# Patient Record
Sex: MALE | Employment: OTHER | URBAN - METROPOLITAN AREA
[De-identification: names, ages, dates, MRNs, and addresses within clinical notes are randomized per-mention and may not be internally consistent; named-entity substitution may affect disease eponyms.]

---

## 2017-01-01 ENCOUNTER — HOSPICE ADMISSION (OUTPATIENT)
Dept: HOSPICE | Facility: HOSPICE | Age: 82
End: 2017-01-01

## 2017-01-01 ENCOUNTER — APPOINTMENT (OUTPATIENT)
Dept: GENERAL RADIOLOGY | Age: 82
DRG: 291 | End: 2017-01-01
Attending: HOSPITALIST
Payer: MEDICARE

## 2017-01-01 ENCOUNTER — HOSPITAL ENCOUNTER (INPATIENT)
Age: 82
LOS: 11 days | DRG: 291 | End: 2017-04-13
Attending: EMERGENCY MEDICINE | Admitting: INTERNAL MEDICINE
Payer: MEDICARE

## 2017-01-01 ENCOUNTER — APPOINTMENT (OUTPATIENT)
Dept: GENERAL RADIOLOGY | Age: 82
DRG: 291 | End: 2017-01-01
Attending: EMERGENCY MEDICINE
Payer: MEDICARE

## 2017-01-01 VITALS
DIASTOLIC BLOOD PRESSURE: 61 MMHG | HEART RATE: 102 BPM | BODY MASS INDEX: 18.12 KG/M2 | SYSTOLIC BLOOD PRESSURE: 105 MMHG | RESPIRATION RATE: 18 BRPM | TEMPERATURE: 98.4 F | HEIGHT: 69 IN | OXYGEN SATURATION: 98 % | WEIGHT: 122.36 LBS

## 2017-01-01 DIAGNOSIS — R13.12 OROPHARYNGEAL DYSPHAGIA: ICD-10-CM

## 2017-01-01 DIAGNOSIS — Z86.73 HISTORY OF CVA (CEREBROVASCULAR ACCIDENT): ICD-10-CM

## 2017-01-01 DIAGNOSIS — J69.0 ASPIRATION PNEUMONIA OF RIGHT UPPER LOBE DUE TO REGURGITATED FOOD (HCC): ICD-10-CM

## 2017-01-01 DIAGNOSIS — N18.3 CKD (CHRONIC KIDNEY DISEASE), STAGE 3 (MODERATE): ICD-10-CM

## 2017-01-01 DIAGNOSIS — R09.02 HYPOXIA: ICD-10-CM

## 2017-01-01 DIAGNOSIS — G40.909 SEIZURE DISORDER (HCC): ICD-10-CM

## 2017-01-01 DIAGNOSIS — J81.0 ACUTE PULMONARY EDEMA (HCC): Primary | ICD-10-CM

## 2017-01-01 DIAGNOSIS — I48.20 CHRONIC ATRIAL FIBRILLATION (HCC): ICD-10-CM

## 2017-01-01 DIAGNOSIS — I50.9 ACUTE ON CHRONIC CONGESTIVE HEART FAILURE, UNSPECIFIED CONGESTIVE HEART FAILURE TYPE: ICD-10-CM

## 2017-01-01 LAB
ALBUMIN SERPL BCP-MCNC: 2.2 G/DL (ref 3.4–5)
ALBUMIN SERPL BCP-MCNC: 2.8 G/DL (ref 3.4–5)
ALBUMIN/GLOB SERPL: 0.4 {RATIO} (ref 0.8–1.7)
ALBUMIN/GLOB SERPL: 0.7 {RATIO} (ref 0.8–1.7)
ALP SERPL-CCNC: 55 U/L (ref 45–117)
ALP SERPL-CCNC: 71 U/L (ref 45–117)
ALT SERPL-CCNC: 18 U/L (ref 16–61)
ALT SERPL-CCNC: 20 U/L (ref 16–61)
ANION GAP BLD CALC-SCNC: 4 MMOL/L (ref 3–18)
ANION GAP BLD CALC-SCNC: 5 MMOL/L (ref 3–18)
ANION GAP BLD CALC-SCNC: 6 MMOL/L (ref 3–18)
ANION GAP BLD CALC-SCNC: 6 MMOL/L (ref 3–18)
ANION GAP BLD CALC-SCNC: 7 MMOL/L (ref 3–18)
ANION GAP BLD CALC-SCNC: 7 MMOL/L (ref 3–18)
APTT PPP: 51 SEC (ref 23–36.4)
ARTERIAL PATENCY WRIST A: ABNORMAL
AST SERPL W P-5'-P-CCNC: 21 U/L (ref 15–37)
AST SERPL W P-5'-P-CCNC: 34 U/L (ref 15–37)
BACTERIA SPEC CULT: NORMAL
BASE EXCESS BLD CALC-SCNC: 6 MMOL/L
BASOPHILS # BLD AUTO: 0.1 K/UL (ref 0–0.06)
BASOPHILS # BLD: 0 % (ref 0–2)
BASOPHILS # BLD: 0 % (ref 0–2)
BASOPHILS # BLD: 1 % (ref 0–2)
BDY SITE: ABNORMAL
BILIRUB SERPL-MCNC: 0.5 MG/DL (ref 0.2–1)
BILIRUB SERPL-MCNC: 0.5 MG/DL (ref 0.2–1)
BNP SERPL-MCNC: 4374 PG/ML (ref 0–1800)
BODY TEMPERATURE: 37
BUN SERPL-MCNC: 20 MG/DL (ref 7–18)
BUN SERPL-MCNC: 24 MG/DL (ref 7–18)
BUN SERPL-MCNC: 26 MG/DL (ref 7–18)
BUN SERPL-MCNC: 38 MG/DL (ref 7–18)
BUN SERPL-MCNC: 58 MG/DL (ref 7–18)
BUN SERPL-MCNC: 59 MG/DL (ref 7–18)
BUN/CREAT SERPL: 15 (ref 12–20)
BUN/CREAT SERPL: 18 (ref 12–20)
BUN/CREAT SERPL: 19 (ref 12–20)
BUN/CREAT SERPL: 20 (ref 12–20)
BUN/CREAT SERPL: 21 (ref 12–20)
BUN/CREAT SERPL: 25 (ref 12–20)
BUN/CREAT SERPL: 39 (ref 12–20)
BUN/CREAT SERPL: 44 (ref 12–20)
CALCIUM SERPL-MCNC: 8.1 MG/DL (ref 8.5–10.1)
CALCIUM SERPL-MCNC: 8.3 MG/DL (ref 8.5–10.1)
CALCIUM SERPL-MCNC: 8.4 MG/DL (ref 8.5–10.1)
CALCIUM SERPL-MCNC: 8.5 MG/DL (ref 8.5–10.1)
CALCIUM SERPL-MCNC: 8.5 MG/DL (ref 8.5–10.1)
CALCIUM SERPL-MCNC: 9.2 MG/DL (ref 8.5–10.1)
CALCIUM SERPL-MCNC: 9.2 MG/DL (ref 8.5–10.1)
CALCIUM SERPL-MCNC: 9.5 MG/DL (ref 8.5–10.1)
CHLORIDE SERPL-SCNC: 101 MMOL/L (ref 100–108)
CHLORIDE SERPL-SCNC: 102 MMOL/L (ref 100–108)
CHLORIDE SERPL-SCNC: 102 MMOL/L (ref 100–108)
CHLORIDE SERPL-SCNC: 103 MMOL/L (ref 100–108)
CHLORIDE SERPL-SCNC: 104 MMOL/L (ref 100–108)
CHLORIDE SERPL-SCNC: 105 MMOL/L (ref 100–108)
CHLORIDE SERPL-SCNC: 106 MMOL/L (ref 100–108)
CHLORIDE SERPL-SCNC: 107 MMOL/L (ref 100–108)
CK MB CFR SERPL CALC: NORMAL % (ref 0–4)
CK MB SERPL-MCNC: <1 NG/ML (ref 5–25)
CK SERPL-CCNC: 50 U/L (ref 39–308)
CK SERPL-CCNC: 64 U/L (ref 39–308)
CK SERPL-CCNC: 78 U/L (ref 39–308)
CK SERPL-CCNC: 78 U/L (ref 39–308)
CK SERPL-CCNC: 81 U/L (ref 39–308)
CK SERPL-CCNC: 81 U/L (ref 39–308)
CO2 SERPL-SCNC: 31 MMOL/L (ref 21–32)
CO2 SERPL-SCNC: 31 MMOL/L (ref 21–32)
CO2 SERPL-SCNC: 33 MMOL/L (ref 21–32)
CO2 SERPL-SCNC: 34 MMOL/L (ref 21–32)
CO2 SERPL-SCNC: 35 MMOL/L (ref 21–32)
CO2 SERPL-SCNC: 36 MMOL/L (ref 21–32)
CO2 SERPL-SCNC: 37 MMOL/L (ref 21–32)
CO2 SERPL-SCNC: 38 MMOL/L (ref 21–32)
CREAT SERPL-MCNC: 1.14 MG/DL (ref 0.6–1.3)
CREAT SERPL-MCNC: 1.24 MG/DL (ref 0.6–1.3)
CREAT SERPL-MCNC: 1.32 MG/DL (ref 0.6–1.3)
CREAT SERPL-MCNC: 1.33 MG/DL (ref 0.6–1.3)
CREAT SERPL-MCNC: 1.33 MG/DL (ref 0.6–1.3)
CREAT SERPL-MCNC: 1.34 MG/DL (ref 0.6–1.3)
CREAT SERPL-MCNC: 1.49 MG/DL (ref 0.6–1.3)
CREAT SERPL-MCNC: 1.51 MG/DL (ref 0.6–1.3)
DIFFERENTIAL METHOD BLD: ABNORMAL
DIGOXIN SERPL-MCNC: 0.8 NG/ML (ref 0.9–2)
DIGOXIN SERPL-MCNC: 1 NG/ML (ref 0.9–2)
DIGOXIN SERPL-MCNC: 1.1 NG/ML (ref 0.9–2)
EOSINOPHIL # BLD: 0.2 K/UL (ref 0–0.4)
EOSINOPHIL # BLD: 0.2 K/UL (ref 0–0.4)
EOSINOPHIL # BLD: 0.3 K/UL (ref 0–0.4)
EOSINOPHIL NFR BLD: 1 % (ref 0–5)
EOSINOPHIL NFR BLD: 1 % (ref 0–5)
EOSINOPHIL NFR BLD: 2 % (ref 0–5)
ERYTHROCYTE [DISTWIDTH] IN BLOOD BY AUTOMATED COUNT: 14.5 % (ref 11.6–14.5)
ERYTHROCYTE [DISTWIDTH] IN BLOOD BY AUTOMATED COUNT: 14.6 % (ref 11.6–14.5)
ERYTHROCYTE [DISTWIDTH] IN BLOOD BY AUTOMATED COUNT: 14.7 % (ref 11.6–14.5)
ERYTHROCYTE [DISTWIDTH] IN BLOOD BY AUTOMATED COUNT: 14.7 % (ref 11.6–14.5)
GAS FLOW.O2 O2 DELIVERY SYS: ABNORMAL L/MIN
GAS FLOW.O2 SETTING OXYMISER: 3 L/M
GLOBULIN SER CALC-MCNC: 4.3 G/DL (ref 2–4)
GLOBULIN SER CALC-MCNC: 5.2 G/DL (ref 2–4)
GLUCOSE BLD STRIP.AUTO-MCNC: 101 MG/DL (ref 70–110)
GLUCOSE BLD STRIP.AUTO-MCNC: 104 MG/DL (ref 70–110)
GLUCOSE BLD STRIP.AUTO-MCNC: 105 MG/DL (ref 70–110)
GLUCOSE BLD STRIP.AUTO-MCNC: 105 MG/DL (ref 70–110)
GLUCOSE BLD STRIP.AUTO-MCNC: 111 MG/DL (ref 70–110)
GLUCOSE BLD STRIP.AUTO-MCNC: 118 MG/DL (ref 70–110)
GLUCOSE BLD STRIP.AUTO-MCNC: 119 MG/DL (ref 70–110)
GLUCOSE BLD STRIP.AUTO-MCNC: 125 MG/DL (ref 70–110)
GLUCOSE BLD STRIP.AUTO-MCNC: 126 MG/DL (ref 70–110)
GLUCOSE BLD STRIP.AUTO-MCNC: 138 MG/DL (ref 70–110)
GLUCOSE BLD STRIP.AUTO-MCNC: 139 MG/DL (ref 70–110)
GLUCOSE BLD STRIP.AUTO-MCNC: 142 MG/DL (ref 70–110)
GLUCOSE BLD STRIP.AUTO-MCNC: 144 MG/DL (ref 70–110)
GLUCOSE BLD STRIP.AUTO-MCNC: 148 MG/DL (ref 70–110)
GLUCOSE BLD STRIP.AUTO-MCNC: 154 MG/DL (ref 70–110)
GLUCOSE BLD STRIP.AUTO-MCNC: 155 MG/DL (ref 70–110)
GLUCOSE BLD STRIP.AUTO-MCNC: 161 MG/DL (ref 70–110)
GLUCOSE BLD STRIP.AUTO-MCNC: 162 MG/DL (ref 70–110)
GLUCOSE BLD STRIP.AUTO-MCNC: 165 MG/DL (ref 70–110)
GLUCOSE BLD STRIP.AUTO-MCNC: 166 MG/DL (ref 70–110)
GLUCOSE BLD STRIP.AUTO-MCNC: 173 MG/DL (ref 70–110)
GLUCOSE BLD STRIP.AUTO-MCNC: 193 MG/DL (ref 70–110)
GLUCOSE BLD STRIP.AUTO-MCNC: 208 MG/DL (ref 70–110)
GLUCOSE BLD STRIP.AUTO-MCNC: 216 MG/DL (ref 70–110)
GLUCOSE BLD STRIP.AUTO-MCNC: 236 MG/DL (ref 70–110)
GLUCOSE BLD STRIP.AUTO-MCNC: 249 MG/DL (ref 70–110)
GLUCOSE BLD STRIP.AUTO-MCNC: 252 MG/DL (ref 70–110)
GLUCOSE BLD STRIP.AUTO-MCNC: 259 MG/DL (ref 70–110)
GLUCOSE BLD STRIP.AUTO-MCNC: 269 MG/DL (ref 70–110)
GLUCOSE BLD STRIP.AUTO-MCNC: 97 MG/DL (ref 70–110)
GLUCOSE SERPL-MCNC: 106 MG/DL (ref 74–99)
GLUCOSE SERPL-MCNC: 118 MG/DL (ref 74–99)
GLUCOSE SERPL-MCNC: 127 MG/DL (ref 74–99)
GLUCOSE SERPL-MCNC: 129 MG/DL (ref 74–99)
GLUCOSE SERPL-MCNC: 157 MG/DL (ref 74–99)
GLUCOSE SERPL-MCNC: 158 MG/DL (ref 74–99)
GLUCOSE SERPL-MCNC: 90 MG/DL (ref 74–99)
GLUCOSE SERPL-MCNC: 98 MG/DL (ref 74–99)
HCO3 BLD-SCNC: 30.7 MMOL/L (ref 22–26)
HCT VFR BLD AUTO: 36.1 % (ref 36–48)
HCT VFR BLD AUTO: 37.8 % (ref 36–48)
HCT VFR BLD AUTO: 39.3 % (ref 36–48)
HCT VFR BLD AUTO: 40 % (ref 36–48)
HCT VFR BLD AUTO: 40.1 % (ref 36–48)
HGB BLD-MCNC: 12 G/DL (ref 13–16)
HGB BLD-MCNC: 12.3 G/DL (ref 13–16)
HGB BLD-MCNC: 12.7 G/DL (ref 13–16)
HGB BLD-MCNC: 13 G/DL (ref 13–16)
HGB BLD-MCNC: 13 G/DL (ref 13–16)
INR PPP: 2.4 (ref 0.8–1.2)
INR PPP: 2.4 (ref 0.8–1.2)
INR PPP: 2.5 (ref 0.8–1.2)
INR PPP: 2.5 (ref 0.8–1.2)
INR PPP: 2.7 (ref 0.8–1.2)
INR PPP: 3 (ref 0.8–1.2)
INR PPP: 3.4 (ref 0.8–1.2)
INR PPP: 3.5 (ref 0.8–1.2)
INR PPP: 3.7 (ref 0.8–1.2)
INR PPP: 4.3 (ref 0.8–1.2)
LACTATE SERPL-SCNC: 0.9 MMOL/L (ref 0.4–2)
LYMPHOCYTES # BLD AUTO: 17 % (ref 21–52)
LYMPHOCYTES # BLD AUTO: 20 % (ref 21–52)
LYMPHOCYTES # BLD AUTO: 26 % (ref 21–52)
LYMPHOCYTES # BLD: 2.7 K/UL (ref 0.9–3.6)
LYMPHOCYTES # BLD: 2.7 K/UL (ref 0.9–3.6)
LYMPHOCYTES # BLD: 3.7 K/UL (ref 0.9–3.6)
MAGNESIUM SERPL-MCNC: 1.8 MG/DL (ref 1.6–2.6)
MAGNESIUM SERPL-MCNC: 1.8 MG/DL (ref 1.8–2.4)
MAGNESIUM SERPL-MCNC: 1.9 MG/DL (ref 1.6–2.6)
MAGNESIUM SERPL-MCNC: 1.9 MG/DL (ref 1.6–2.6)
MAGNESIUM SERPL-MCNC: 2 MG/DL (ref 1.6–2.6)
MAGNESIUM SERPL-MCNC: 2.1 MG/DL (ref 1.6–2.6)
MAGNESIUM SERPL-MCNC: 2.1 MG/DL (ref 1.6–2.6)
MAGNESIUM SERPL-MCNC: 2.3 MG/DL (ref 1.6–2.6)
MAGNESIUM SERPL-MCNC: 2.4 MG/DL (ref 1.6–2.6)
MCH RBC QN AUTO: 30.8 PG (ref 24–34)
MCH RBC QN AUTO: 30.8 PG (ref 24–34)
MCH RBC QN AUTO: 31 PG (ref 24–34)
MCH RBC QN AUTO: 31.3 PG (ref 24–34)
MCHC RBC AUTO-ENTMCNC: 32.3 G/DL (ref 31–37)
MCHC RBC AUTO-ENTMCNC: 32.5 G/DL (ref 31–37)
MCHC RBC AUTO-ENTMCNC: 32.5 G/DL (ref 31–37)
MCHC RBC AUTO-ENTMCNC: 33.2 G/DL (ref 31–37)
MCV RBC AUTO: 94 FL (ref 74–97)
MCV RBC AUTO: 94.5 FL (ref 74–97)
MCV RBC AUTO: 95.4 FL (ref 74–97)
MCV RBC AUTO: 95.5 FL (ref 74–97)
MONOCYTES # BLD: 1.7 K/UL (ref 0.05–1.2)
MONOCYTES # BLD: 2 K/UL (ref 0.05–1.2)
MONOCYTES # BLD: 2.6 K/UL (ref 0.05–1.2)
MONOCYTES NFR BLD AUTO: 13 % (ref 3–10)
MONOCYTES NFR BLD AUTO: 13 % (ref 3–10)
MONOCYTES NFR BLD AUTO: 19 % (ref 3–10)
NEUTS SEG # BLD: 10.6 K/UL (ref 1.8–8)
NEUTS SEG # BLD: 7.6 K/UL (ref 1.8–8)
NEUTS SEG # BLD: 9 K/UL (ref 1.8–8)
NEUTS SEG NFR BLD AUTO: 53 % (ref 40–73)
NEUTS SEG NFR BLD AUTO: 65 % (ref 40–73)
NEUTS SEG NFR BLD AUTO: 69 % (ref 40–73)
O2/TOTAL GAS SETTING VFR VENT: 0.32 %
PCO2 BLD: 49.3 MMHG (ref 35–45)
PH BLD: 7.4 [PH] (ref 7.35–7.45)
PHENYTOIN SERPL-MCNC: 6 UG/ML (ref 10–20)
PHENYTOIN SERPL-MCNC: 8.3 UG/ML (ref 10–20)
PLATELET # BLD AUTO: 101 K/UL (ref 135–420)
PLATELET # BLD AUTO: 105 K/UL (ref 135–420)
PLATELET # BLD AUTO: 155 K/UL (ref 135–420)
PLATELET # BLD AUTO: 174 K/UL (ref 135–420)
PLATELET # BLD AUTO: 93 K/UL (ref 135–420)
PMV BLD AUTO: 10 FL (ref 9.2–11.8)
PMV BLD AUTO: 10.2 FL (ref 9.2–11.8)
PMV BLD AUTO: 10.3 FL (ref 9.2–11.8)
PMV BLD AUTO: 10.4 FL (ref 9.2–11.8)
PO2 BLD: 113 MMHG (ref 80–100)
POTASSIUM SERPL-SCNC: 3.6 MMOL/L (ref 3.5–5.5)
POTASSIUM SERPL-SCNC: 3.6 MMOL/L (ref 3.5–5.5)
POTASSIUM SERPL-SCNC: 3.7 MMOL/L (ref 3.5–5.5)
POTASSIUM SERPL-SCNC: 3.8 MMOL/L (ref 3.5–5.5)
POTASSIUM SERPL-SCNC: 3.9 MMOL/L (ref 3.5–5.5)
POTASSIUM SERPL-SCNC: 4 MMOL/L (ref 3.5–5.5)
POTASSIUM SERPL-SCNC: 4.1 MMOL/L (ref 3.5–5.5)
POTASSIUM SERPL-SCNC: 4.2 MMOL/L (ref 3.5–5.5)
PROT SERPL-MCNC: 7.1 G/DL (ref 6.4–8.2)
PROT SERPL-MCNC: 7.4 G/DL (ref 6.4–8.2)
PROTHROMBIN TIME: 24.8 SEC (ref 11.5–15.2)
PROTHROMBIN TIME: 25.1 SEC (ref 11.5–15.2)
PROTHROMBIN TIME: 25.8 SEC (ref 11.5–15.2)
PROTHROMBIN TIME: 25.9 SEC (ref 11.5–15.2)
PROTHROMBIN TIME: 27.5 SEC (ref 11.5–15.2)
PROTHROMBIN TIME: 29.6 SEC (ref 11.5–15.2)
PROTHROMBIN TIME: 32.6 SEC (ref 11.5–15.2)
PROTHROMBIN TIME: 33.2 SEC (ref 11.5–15.2)
PROTHROMBIN TIME: 34.4 SEC (ref 11.5–15.2)
PROTHROMBIN TIME: 38.9 SEC (ref 11.5–15.2)
RBC # BLD AUTO: 3.84 M/UL (ref 4.7–5.5)
RBC # BLD AUTO: 4 M/UL (ref 4.7–5.5)
RBC # BLD AUTO: 4.12 M/UL (ref 4.7–5.5)
RBC # BLD AUTO: 4.19 M/UL (ref 4.7–5.5)
SAO2 % BLD: 98 % (ref 92–97)
SERVICE CMNT-IMP: ABNORMAL
SERVICE CMNT-IMP: NORMAL
SODIUM SERPL-SCNC: 137 MMOL/L (ref 136–145)
SODIUM SERPL-SCNC: 138 MMOL/L (ref 136–145)
SODIUM SERPL-SCNC: 139 MMOL/L (ref 136–145)
SODIUM SERPL-SCNC: 145 MMOL/L (ref 136–145)
SODIUM SERPL-SCNC: 145 MMOL/L (ref 136–145)
SODIUM SERPL-SCNC: 147 MMOL/L (ref 136–145)
SODIUM SERPL-SCNC: 148 MMOL/L (ref 136–145)
SODIUM SERPL-SCNC: 149 MMOL/L (ref 136–145)
SPECIMEN TYPE: ABNORMAL
TOTAL RESP. RATE, ITRR: 25
TROPONIN I SERPL-MCNC: 0.06 NG/ML (ref 0–0.06)
WBC # BLD AUTO: 13.2 K/UL (ref 4.6–13.2)
WBC # BLD AUTO: 13.7 K/UL (ref 4.6–13.2)
WBC # BLD AUTO: 14.3 K/UL (ref 4.6–13.2)
WBC # BLD AUTO: 15.5 K/UL (ref 4.6–13.2)

## 2017-01-01 PROCEDURE — 71010 XR CHEST PORT: CPT

## 2017-01-01 PROCEDURE — 94640 AIRWAY INHALATION TREATMENT: CPT

## 2017-01-01 PROCEDURE — 82803 BLOOD GASES ANY COMBINATION: CPT

## 2017-01-01 PROCEDURE — 77010033678 HC OXYGEN DAILY

## 2017-01-01 PROCEDURE — 36415 COLL VENOUS BLD VENIPUNCTURE: CPT | Performed by: INTERNAL MEDICINE

## 2017-01-01 PROCEDURE — 97530 THERAPEUTIC ACTIVITIES: CPT

## 2017-01-01 PROCEDURE — 85018 HEMOGLOBIN: CPT | Performed by: INTERNAL MEDICINE

## 2017-01-01 PROCEDURE — 74011250636 HC RX REV CODE- 250/636: Performed by: INTERNAL MEDICINE

## 2017-01-01 PROCEDURE — 83735 ASSAY OF MAGNESIUM: CPT | Performed by: INTERNAL MEDICINE

## 2017-01-01 PROCEDURE — 74011250637 HC RX REV CODE- 250/637: Performed by: INTERNAL MEDICINE

## 2017-01-01 PROCEDURE — 74011250636 HC RX REV CODE- 250/636: Performed by: FAMILY MEDICINE

## 2017-01-01 PROCEDURE — 94669 MECHANICAL CHEST WALL OSCILL: CPT

## 2017-01-01 PROCEDURE — 74011636637 HC RX REV CODE- 636/637: Performed by: FAMILY MEDICINE

## 2017-01-01 PROCEDURE — 74011000250 HC RX REV CODE- 250: Performed by: INTERNAL MEDICINE

## 2017-01-01 PROCEDURE — 74011250636 HC RX REV CODE- 250/636: Performed by: HOSPITALIST

## 2017-01-01 PROCEDURE — 94760 N-INVAS EAR/PLS OXIMETRY 1: CPT

## 2017-01-01 PROCEDURE — 80048 BASIC METABOLIC PNL TOTAL CA: CPT | Performed by: INTERNAL MEDICINE

## 2017-01-01 PROCEDURE — 31720 CLEARANCE OF AIRWAYS: CPT

## 2017-01-01 PROCEDURE — 85025 COMPLETE CBC W/AUTO DIFF WBC: CPT | Performed by: INTERNAL MEDICINE

## 2017-01-01 PROCEDURE — 85610 PROTHROMBIN TIME: CPT | Performed by: INTERNAL MEDICINE

## 2017-01-01 PROCEDURE — 74011250637 HC RX REV CODE- 250/637: Performed by: HOSPITALIST

## 2017-01-01 PROCEDURE — 80053 COMPREHEN METABOLIC PANEL: CPT | Performed by: INTERNAL MEDICINE

## 2017-01-01 PROCEDURE — 85027 COMPLETE CBC AUTOMATED: CPT | Performed by: INTERNAL MEDICINE

## 2017-01-01 PROCEDURE — 83880 ASSAY OF NATRIURETIC PEPTIDE: CPT | Performed by: EMERGENCY MEDICINE

## 2017-01-01 PROCEDURE — 97116 GAIT TRAINING THERAPY: CPT

## 2017-01-01 PROCEDURE — 80162 ASSAY OF DIGOXIN TOTAL: CPT | Performed by: INTERNAL MEDICINE

## 2017-01-01 PROCEDURE — 74011000250 HC RX REV CODE- 250: Performed by: HOSPITALIST

## 2017-01-01 PROCEDURE — 65660000000 HC RM CCU STEPDOWN

## 2017-01-01 PROCEDURE — 74230 X-RAY XM SWLNG FUNCJ C+: CPT

## 2017-01-01 PROCEDURE — 99285 EMERGENCY DEPT VISIT HI MDM: CPT

## 2017-01-01 PROCEDURE — 82962 GLUCOSE BLOOD TEST: CPT

## 2017-01-01 PROCEDURE — 76450000000

## 2017-01-01 PROCEDURE — 74011250637 HC RX REV CODE- 250/637: Performed by: FAMILY MEDICINE

## 2017-01-01 PROCEDURE — 92526 ORAL FUNCTION THERAPY: CPT

## 2017-01-01 PROCEDURE — 80185 ASSAY OF PHENYTOIN TOTAL: CPT | Performed by: EMERGENCY MEDICINE

## 2017-01-01 PROCEDURE — 85049 AUTOMATED PLATELET COUNT: CPT | Performed by: INTERNAL MEDICINE

## 2017-01-01 PROCEDURE — 80053 COMPREHEN METABOLIC PANEL: CPT | Performed by: EMERGENCY MEDICINE

## 2017-01-01 PROCEDURE — 87040 BLOOD CULTURE FOR BACTERIA: CPT | Performed by: EMERGENCY MEDICINE

## 2017-01-01 PROCEDURE — 92610 EVALUATE SWALLOWING FUNCTION: CPT

## 2017-01-01 PROCEDURE — 97166 OT EVAL MOD COMPLEX 45 MIN: CPT

## 2017-01-01 PROCEDURE — 74011250636 HC RX REV CODE- 250/636: Performed by: EMERGENCY MEDICINE

## 2017-01-01 PROCEDURE — 36600 WITHDRAWAL OF ARTERIAL BLOOD: CPT

## 2017-01-01 PROCEDURE — 96374 THER/PROPH/DIAG INJ IV PUSH: CPT

## 2017-01-01 PROCEDURE — 97535 SELF CARE MNGMENT TRAINING: CPT

## 2017-01-01 PROCEDURE — 74011000255 HC RX REV CODE- 255: Performed by: EMERGENCY MEDICINE

## 2017-01-01 PROCEDURE — 85025 COMPLETE CBC W/AUTO DIFF WBC: CPT | Performed by: EMERGENCY MEDICINE

## 2017-01-01 PROCEDURE — 85610 PROTHROMBIN TIME: CPT | Performed by: EMERGENCY MEDICINE

## 2017-01-01 PROCEDURE — 97162 PT EVAL MOD COMPLEX 30 MIN: CPT

## 2017-01-01 PROCEDURE — 94668 MNPJ CHEST WALL SBSQ: CPT

## 2017-01-01 PROCEDURE — 82550 ASSAY OF CK (CPK): CPT | Performed by: EMERGENCY MEDICINE

## 2017-01-01 PROCEDURE — 77030013140 HC MSK NEB VYRM -A

## 2017-01-01 PROCEDURE — 74011000250 HC RX REV CODE- 250: Performed by: EMERGENCY MEDICINE

## 2017-01-01 PROCEDURE — 82550 ASSAY OF CK (CPK): CPT | Performed by: INTERNAL MEDICINE

## 2017-01-01 PROCEDURE — 85730 THROMBOPLASTIN TIME PARTIAL: CPT | Performed by: EMERGENCY MEDICINE

## 2017-01-01 PROCEDURE — 65270000029 HC RM PRIVATE

## 2017-01-01 PROCEDURE — 83605 ASSAY OF LACTIC ACID: CPT | Performed by: EMERGENCY MEDICINE

## 2017-01-01 PROCEDURE — 80185 ASSAY OF PHENYTOIN TOTAL: CPT | Performed by: INTERNAL MEDICINE

## 2017-01-01 PROCEDURE — 77030011256 HC DRSG MEPILEX <16IN NO BORD MOLN -A

## 2017-01-01 PROCEDURE — C8924 2D TTE W OR W/O FOL W/CON,FU: HCPCS

## 2017-01-01 PROCEDURE — 80162 ASSAY OF DIGOXIN TOTAL: CPT | Performed by: EMERGENCY MEDICINE

## 2017-01-01 PROCEDURE — 92611 MOTION FLUOROSCOPY/SWALLOW: CPT

## 2017-01-01 RX ORDER — DEXTROSE 50 % IN WATER (D50W) INTRAVENOUS SYRINGE
25-50 AS NEEDED
Status: DISCONTINUED | OUTPATIENT
Start: 2017-01-01 | End: 2017-01-01

## 2017-01-01 RX ORDER — INSULIN LISPRO 100 [IU]/ML
INJECTION, SOLUTION INTRAVENOUS; SUBCUTANEOUS
Status: DISCONTINUED | OUTPATIENT
Start: 2017-01-01 | End: 2017-01-01

## 2017-01-01 RX ORDER — SPIRONOLACTONE 25 MG/1
25 TABLET ORAL DAILY
COMMUNITY

## 2017-01-01 RX ORDER — SODIUM CHLORIDE 9 MG/ML
250 INJECTION, SOLUTION INTRAVENOUS CONTINUOUS
Status: DISPENSED | OUTPATIENT
Start: 2017-01-01 | End: 2017-01-01

## 2017-01-01 RX ORDER — CARVEDILOL 6.25 MG/1
6.25 TABLET ORAL 2 TIMES DAILY WITH MEALS
COMMUNITY

## 2017-01-01 RX ORDER — FUROSEMIDE 10 MG/ML
40 INJECTION INTRAMUSCULAR; INTRAVENOUS DAILY
Status: DISCONTINUED | OUTPATIENT
Start: 2017-01-01 | End: 2017-01-01

## 2017-01-01 RX ORDER — PHENYTOIN SODIUM 50 MG/ML
100 INJECTION, SOLUTION INTRAMUSCULAR; INTRAVENOUS EVERY 8 HOURS
Status: DISCONTINUED | OUTPATIENT
Start: 2017-01-01 | End: 2017-01-01

## 2017-01-01 RX ORDER — ACETAMINOPHEN 650 MG/1
650 SUPPOSITORY RECTAL
Status: DISCONTINUED | OUTPATIENT
Start: 2017-01-01 | End: 2017-01-01 | Stop reason: HOSPADM

## 2017-01-01 RX ORDER — WARFARIN 1 MG/1
1 TABLET ORAL ONCE
Status: ACTIVE | OUTPATIENT
Start: 2017-01-01 | End: 2017-01-01

## 2017-01-01 RX ORDER — DIGOXIN 250 MCG
0.25 TABLET ORAL
Status: DISCONTINUED | OUTPATIENT
Start: 2017-01-01 | End: 2017-01-01

## 2017-01-01 RX ORDER — LORAZEPAM 2 MG/ML
1 INJECTION INTRAMUSCULAR
Status: DISCONTINUED | OUTPATIENT
Start: 2017-01-01 | End: 2017-01-01 | Stop reason: HOSPADM

## 2017-01-01 RX ORDER — SCOLOPAMINE TRANSDERMAL SYSTEM 1 MG/1
1.5 PATCH, EXTENDED RELEASE TRANSDERMAL
Status: DISCONTINUED | OUTPATIENT
Start: 2017-01-01 | End: 2017-01-01

## 2017-01-01 RX ORDER — WARFARIN 4 MG/1
4 TABLET ORAL ONCE
Status: DISPENSED | OUTPATIENT
Start: 2017-01-01 | End: 2017-01-01

## 2017-01-01 RX ORDER — VALSARTAN 40 MG/1
20 TABLET ORAL DAILY
Status: DISCONTINUED | OUTPATIENT
Start: 2017-01-01 | End: 2017-01-01

## 2017-01-01 RX ORDER — FUROSEMIDE 10 MG/ML
20 INJECTION INTRAMUSCULAR; INTRAVENOUS 2 TIMES DAILY
Status: DISCONTINUED | OUTPATIENT
Start: 2017-01-01 | End: 2017-01-01

## 2017-01-01 RX ORDER — FACIAL-BODY WIPES
10 EACH TOPICAL DAILY PRN
Status: DISCONTINUED | OUTPATIENT
Start: 2017-01-01 | End: 2017-01-01 | Stop reason: HOSPADM

## 2017-01-01 RX ORDER — CARVEDILOL 3.12 MG/1
3.24 TABLET ORAL 2 TIMES DAILY WITH MEALS
Status: DISCONTINUED | OUTPATIENT
Start: 2017-01-01 | End: 2017-01-01

## 2017-01-01 RX ORDER — METOPROLOL TARTRATE 5 MG/5ML
1.25 INJECTION INTRAVENOUS
Status: DISCONTINUED | OUTPATIENT
Start: 2017-01-01 | End: 2017-01-01 | Stop reason: HOSPADM

## 2017-01-01 RX ORDER — FUROSEMIDE 10 MG/ML
40 INJECTION INTRAMUSCULAR; INTRAVENOUS 2 TIMES DAILY
Status: DISCONTINUED | OUTPATIENT
Start: 2017-01-01 | End: 2017-01-01

## 2017-01-01 RX ORDER — ACETAMINOPHEN 325 MG/1
650 TABLET ORAL
Status: DISCONTINUED | OUTPATIENT
Start: 2017-01-01 | End: 2017-01-01

## 2017-01-01 RX ORDER — POLYETHYLENE GLYCOL 3350 17 G/17G
17 POWDER, FOR SOLUTION ORAL 2 TIMES DAILY
Status: DISCONTINUED | OUTPATIENT
Start: 2017-01-01 | End: 2017-01-01

## 2017-01-01 RX ORDER — SPIRONOLACTONE 25 MG/1
25 TABLET ORAL DAILY
Status: DISCONTINUED | OUTPATIENT
Start: 2017-01-01 | End: 2017-01-01

## 2017-01-01 RX ORDER — WARFARIN 4 MG/1
4 TABLET ORAL ONCE
Status: COMPLETED | OUTPATIENT
Start: 2017-01-01 | End: 2017-01-01

## 2017-01-01 RX ORDER — DIGOXIN 0.25 MG/ML
125 INJECTION INTRAMUSCULAR; INTRAVENOUS DAILY
Status: DISCONTINUED | OUTPATIENT
Start: 2017-01-01 | End: 2017-01-01

## 2017-01-01 RX ORDER — BUDESONIDE 0.5 MG/2ML
500 INHALANT ORAL 2 TIMES DAILY
Status: DISCONTINUED | OUTPATIENT
Start: 2017-01-01 | End: 2017-01-01

## 2017-01-01 RX ORDER — LISINOPRIL 5 MG/1
2.5 TABLET ORAL DAILY
Status: DISCONTINUED | OUTPATIENT
Start: 2017-01-01 | End: 2017-01-01

## 2017-01-01 RX ORDER — DIGOXIN 0.25 MG/ML
100 INJECTION INTRAMUSCULAR; INTRAVENOUS DAILY
Status: DISCONTINUED | OUTPATIENT
Start: 2017-01-01 | End: 2017-01-01

## 2017-01-01 RX ORDER — PHENYTOIN 50 MG/1
100 TABLET, CHEWABLE ORAL 2 TIMES DAILY
Status: DISCONTINUED | OUTPATIENT
Start: 2017-01-01 | End: 2017-01-01 | Stop reason: HOSPADM

## 2017-01-01 RX ORDER — PHENYTOIN SODIUM 50 MG/ML
300 INJECTION, SOLUTION INTRAMUSCULAR; INTRAVENOUS EVERY 24 HOURS
Status: DISCONTINUED | OUTPATIENT
Start: 2017-01-01 | End: 2017-01-01 | Stop reason: DRUGHIGH

## 2017-01-01 RX ORDER — BUDESONIDE 0.5 MG/2ML
500 INHALANT ORAL
Status: DISCONTINUED | OUTPATIENT
Start: 2017-01-01 | End: 2017-01-01

## 2017-01-01 RX ORDER — GUAIFENESIN 600 MG/1
600 TABLET, EXTENDED RELEASE ORAL EVERY 12 HOURS
Status: DISCONTINUED | OUTPATIENT
Start: 2017-01-01 | End: 2017-01-01

## 2017-01-01 RX ORDER — LEVOTHYROXINE SODIUM 50 UG/1
50 TABLET ORAL
Status: DISCONTINUED | OUTPATIENT
Start: 2017-01-01 | End: 2017-01-01

## 2017-01-01 RX ORDER — PHENYTOIN SODIUM 100 MG/1
300 CAPSULE, EXTENDED RELEASE ORAL EVERY OTHER DAY
Status: DISCONTINUED | OUTPATIENT
Start: 2017-01-01 | End: 2017-01-01

## 2017-01-01 RX ORDER — FUROSEMIDE 10 MG/ML
80 INJECTION INTRAMUSCULAR; INTRAVENOUS
Status: COMPLETED | OUTPATIENT
Start: 2017-01-01 | End: 2017-01-01

## 2017-01-01 RX ORDER — PHENYTOIN SODIUM 100 MG/1
300 CAPSULE, EXTENDED RELEASE ORAL
Status: DISCONTINUED | OUTPATIENT
Start: 2017-01-01 | End: 2017-01-01

## 2017-01-01 RX ORDER — MAGNESIUM SULFATE 100 %
16 CRYSTALS MISCELLANEOUS AS NEEDED
Status: DISCONTINUED | OUTPATIENT
Start: 2017-01-01 | End: 2017-01-01

## 2017-01-01 RX ORDER — WARFARIN 3 MG/1
3 TABLET ORAL ONCE
Status: COMPLETED | OUTPATIENT
Start: 2017-01-01 | End: 2017-01-01

## 2017-01-01 RX ORDER — PHENYTOIN SODIUM 50 MG/ML
300 INJECTION, SOLUTION INTRAMUSCULAR; INTRAVENOUS EVERY 24 HOURS
Status: DISCONTINUED | OUTPATIENT
Start: 2017-01-01 | End: 2017-01-01

## 2017-01-01 RX ORDER — FUROSEMIDE 10 MG/ML
20 INJECTION INTRAMUSCULAR; INTRAVENOUS ONCE
Status: COMPLETED | OUTPATIENT
Start: 2017-01-01 | End: 2017-01-01

## 2017-01-01 RX ORDER — PHENYTOIN 50 MG/1
100 TABLET, CHEWABLE ORAL 3 TIMES DAILY
Status: DISCONTINUED | OUTPATIENT
Start: 2017-01-01 | End: 2017-01-01

## 2017-01-01 RX ORDER — MORPHINE SULFATE 20 MG/ML
10 SOLUTION ORAL
Status: DISCONTINUED | OUTPATIENT
Start: 2017-01-01 | End: 2017-01-01 | Stop reason: HOSPADM

## 2017-01-01 RX ORDER — DIGOXIN 125 MCG
0.12 TABLET ORAL
Status: DISCONTINUED | OUTPATIENT
Start: 2017-01-01 | End: 2017-01-01

## 2017-01-01 RX ORDER — LEVOFLOXACIN 500 MG/1
500 TABLET, FILM COATED ORAL EVERY 24 HOURS
Status: DISCONTINUED | OUTPATIENT
Start: 2017-01-01 | End: 2017-01-01

## 2017-01-01 RX ORDER — IPRATROPIUM BROMIDE AND ALBUTEROL SULFATE 2.5; .5 MG/3ML; MG/3ML
3 SOLUTION RESPIRATORY (INHALATION)
Status: DISCONTINUED | OUTPATIENT
Start: 2017-01-01 | End: 2017-01-01 | Stop reason: HOSPADM

## 2017-01-01 RX ORDER — LEVOFLOXACIN 5 MG/ML
500 INJECTION, SOLUTION INTRAVENOUS EVERY 24 HOURS
Status: DISCONTINUED | OUTPATIENT
Start: 2017-01-01 | End: 2017-01-01

## 2017-01-01 RX ORDER — ATROPINE SULFATE 10 MG/ML
3 SOLUTION/ DROPS OPHTHALMIC
Status: DISCONTINUED | OUTPATIENT
Start: 2017-01-01 | End: 2017-01-01 | Stop reason: HOSPADM

## 2017-01-01 RX ORDER — DEXTROSE MONOHYDRATE 50 MG/ML
25 INJECTION, SOLUTION INTRAVENOUS CONTINUOUS
Status: DISCONTINUED | OUTPATIENT
Start: 2017-01-01 | End: 2017-01-01

## 2017-01-01 RX ORDER — DOCUSATE SODIUM 100 MG/1
100 CAPSULE, LIQUID FILLED ORAL DAILY
Status: DISCONTINUED | OUTPATIENT
Start: 2017-01-01 | End: 2017-01-01

## 2017-01-01 RX ORDER — IPRATROPIUM BROMIDE AND ALBUTEROL SULFATE 2.5; .5 MG/3ML; MG/3ML
3 SOLUTION RESPIRATORY (INHALATION)
Status: COMPLETED | OUTPATIENT
Start: 2017-01-01 | End: 2017-01-01

## 2017-01-01 RX ORDER — ATORVASTATIN CALCIUM 20 MG/1
40 TABLET, FILM COATED ORAL DAILY
Status: DISCONTINUED | OUTPATIENT
Start: 2017-01-01 | End: 2017-01-01

## 2017-01-01 RX ORDER — DEXTROSE MONOHYDRATE 50 MG/ML
50 INJECTION, SOLUTION INTRAVENOUS CONTINUOUS
Status: DISCONTINUED | OUTPATIENT
Start: 2017-01-01 | End: 2017-01-01

## 2017-01-01 RX ORDER — ISOSORBIDE DINITRATE 30 MG/1
20 TABLET ORAL DAILY
COMMUNITY

## 2017-01-01 RX ORDER — ONDANSETRON 2 MG/ML
4 INJECTION INTRAMUSCULAR; INTRAVENOUS
Status: DISCONTINUED | OUTPATIENT
Start: 2017-01-01 | End: 2017-01-01 | Stop reason: HOSPADM

## 2017-01-01 RX ORDER — PHENYTOIN SODIUM 100 MG/1
200 CAPSULE, EXTENDED RELEASE ORAL EVERY OTHER DAY
Status: DISCONTINUED | OUTPATIENT
Start: 2017-01-01 | End: 2017-01-01

## 2017-01-01 RX ORDER — FUROSEMIDE 10 MG/ML
10 INJECTION INTRAMUSCULAR; INTRAVENOUS ONCE
Status: COMPLETED | OUTPATIENT
Start: 2017-01-01 | End: 2017-01-01

## 2017-01-01 RX ORDER — VALSARTAN 160 MG/1
160 TABLET ORAL DAILY
Status: DISCONTINUED | OUTPATIENT
Start: 2017-01-01 | End: 2017-01-01

## 2017-01-01 RX ORDER — DIGOXIN 125 MCG
0.12 TABLET ORAL DAILY
Status: DISCONTINUED | OUTPATIENT
Start: 2017-01-01 | End: 2017-01-01

## 2017-01-01 RX ADMIN — BUDESONIDE 500 MCG: 0.5 INHALANT RESPIRATORY (INHALATION) at 07:47

## 2017-01-01 RX ADMIN — CARVEDILOL 3.12 MG: 3.12 TABLET, FILM COATED ORAL at 17:26

## 2017-01-01 RX ADMIN — IPRATROPIUM BROMIDE AND ALBUTEROL SULFATE 3 ML: .5; 3 SOLUTION RESPIRATORY (INHALATION) at 07:47

## 2017-01-01 RX ADMIN — GUAIFENESIN 600 MG: 600 TABLET, EXTENDED RELEASE ORAL at 08:36

## 2017-01-01 RX ADMIN — PHENYTOIN SODIUM 100 MG: 50 INJECTION INTRAMUSCULAR; INTRAVENOUS at 14:45

## 2017-01-01 RX ADMIN — IPRATROPIUM BROMIDE AND ALBUTEROL SULFATE 3 ML: .5; 3 SOLUTION RESPIRATORY (INHALATION) at 23:58

## 2017-01-01 RX ADMIN — VALSARTAN 20 MG: 40 TABLET, FILM COATED ORAL at 11:44

## 2017-01-01 RX ADMIN — SODIUM CHLORIDE 500 ML: 900 INJECTION, SOLUTION INTRAVENOUS at 01:00

## 2017-01-01 RX ADMIN — IPRATROPIUM BROMIDE AND ALBUTEROL SULFATE 3 ML: .5; 3 SOLUTION RESPIRATORY (INHALATION) at 19:46

## 2017-01-01 RX ADMIN — ATORVASTATIN CALCIUM 40 MG: 20 TABLET, FILM COATED ORAL at 08:42

## 2017-01-01 RX ADMIN — PHENYTOIN 100 MG: 50 TABLET, CHEWABLE ORAL at 11:44

## 2017-01-01 RX ADMIN — BUDESONIDE 500 MCG: 0.5 INHALANT RESPIRATORY (INHALATION) at 13:11

## 2017-01-01 RX ADMIN — INSULIN LISPRO 2 UNITS: 100 INJECTION, SOLUTION INTRAVENOUS; SUBCUTANEOUS at 22:25

## 2017-01-01 RX ADMIN — EXTENDED PHENYTOIN SODIUM 300 MG: 100 CAPSULE ORAL at 22:15

## 2017-01-01 RX ADMIN — BUDESONIDE 500 MCG: 0.5 INHALANT RESPIRATORY (INHALATION) at 07:20

## 2017-01-01 RX ADMIN — POLYETHYLENE GLYCOL 3350 17 G: 17 POWDER, FOR SOLUTION ORAL at 11:12

## 2017-01-01 RX ADMIN — PHENYTOIN SODIUM 100 MG: 50 INJECTION INTRAMUSCULAR; INTRAVENOUS at 06:58

## 2017-01-01 RX ADMIN — IPRATROPIUM BROMIDE AND ALBUTEROL SULFATE 3 ML: .5; 3 SOLUTION RESPIRATORY (INHALATION) at 07:11

## 2017-01-01 RX ADMIN — EXTENDED PHENYTOIN SODIUM 300 MG: 100 CAPSULE ORAL at 22:25

## 2017-01-01 RX ADMIN — LEVOFLOXACIN 500 MG: 5 INJECTION, SOLUTION INTRAVENOUS at 17:03

## 2017-01-01 RX ADMIN — METOROPROLOL TARTRATE 1.25 MG: 5 INJECTION, SOLUTION INTRAVENOUS at 17:02

## 2017-01-01 RX ADMIN — WARFARIN SODIUM 4 MG: 4 TABLET ORAL at 17:43

## 2017-01-01 RX ADMIN — SODIUM CHLORIDE 500 ML: 900 INJECTION, SOLUTION INTRAVENOUS at 18:37

## 2017-01-01 RX ADMIN — FUROSEMIDE 20 MG: 10 INJECTION, SOLUTION INTRAMUSCULAR; INTRAVENOUS at 16:18

## 2017-01-01 RX ADMIN — LEVOTHYROXINE SODIUM 50 MCG: 50 TABLET ORAL at 06:49

## 2017-01-01 RX ADMIN — SPIRONOLACTONE 25 MG: 25 TABLET, FILM COATED ORAL at 08:38

## 2017-01-01 RX ADMIN — POLYETHYLENE GLYCOL 3350 17 G: 17 POWDER, FOR SOLUTION ORAL at 22:58

## 2017-01-01 RX ADMIN — BUDESONIDE 500 MCG: 0.5 INHALANT RESPIRATORY (INHALATION) at 07:11

## 2017-01-01 RX ADMIN — ISOSORBIDE DINITRATE 30 MG: 10 TABLET ORAL at 17:26

## 2017-01-01 RX ADMIN — CARVEDILOL 3.12 MG: 3.12 TABLET, FILM COATED ORAL at 11:12

## 2017-01-01 RX ADMIN — BUDESONIDE 500 MCG: 0.5 INHALANT RESPIRATORY (INHALATION) at 20:20

## 2017-01-01 RX ADMIN — ATORVASTATIN CALCIUM 40 MG: 20 TABLET, FILM COATED ORAL at 11:12

## 2017-01-01 RX ADMIN — IPRATROPIUM BROMIDE AND ALBUTEROL SULFATE 3 ML: .5; 3 SOLUTION RESPIRATORY (INHALATION) at 19:47

## 2017-01-01 RX ADMIN — SPIRONOLACTONE 25 MG: 25 TABLET, FILM COATED ORAL at 08:41

## 2017-01-01 RX ADMIN — DIGOXIN 0.12 MG: 0.12 TABLET ORAL at 11:12

## 2017-01-01 RX ADMIN — SPIRONOLACTONE 25 MG: 25 TABLET, FILM COATED ORAL at 10:44

## 2017-01-01 RX ADMIN — BARIUM SULFATE 20 ML: 400 SUSPENSION ORAL at 15:30

## 2017-01-01 RX ADMIN — PHENYTOIN SODIUM 100 MG: 50 INJECTION INTRAMUSCULAR; INTRAVENOUS at 06:40

## 2017-01-01 RX ADMIN — FUROSEMIDE 80 MG: 10 INJECTION, SOLUTION INTRAMUSCULAR; INTRAVENOUS at 06:21

## 2017-01-01 RX ADMIN — PHENYTOIN SODIUM 100 MG: 50 INJECTION INTRAMUSCULAR; INTRAVENOUS at 22:03

## 2017-01-01 RX ADMIN — INSULIN LISPRO 6 UNITS: 100 INJECTION, SOLUTION INTRAVENOUS; SUBCUTANEOUS at 12:20

## 2017-01-01 RX ADMIN — VALSARTAN 20 MG: 40 TABLET, FILM COATED ORAL at 11:12

## 2017-01-01 RX ADMIN — GUAIFENESIN 600 MG: 600 TABLET, EXTENDED RELEASE ORAL at 11:11

## 2017-01-01 RX ADMIN — GUAIFENESIN 600 MG: 600 TABLET, EXTENDED RELEASE ORAL at 08:41

## 2017-01-01 RX ADMIN — ATORVASTATIN CALCIUM 40 MG: 20 TABLET, FILM COATED ORAL at 11:44

## 2017-01-01 RX ADMIN — CARVEDILOL 3.12 MG: 3.12 TABLET, FILM COATED ORAL at 08:40

## 2017-01-01 RX ADMIN — FUROSEMIDE 40 MG: 10 INJECTION, SOLUTION INTRAMUSCULAR; INTRAVENOUS at 10:44

## 2017-01-01 RX ADMIN — BUDESONIDE 500 MCG: 0.5 INHALANT RESPIRATORY (INHALATION) at 19:45

## 2017-01-01 RX ADMIN — GUAIFENESIN 600 MG: 600 TABLET, EXTENDED RELEASE ORAL at 22:15

## 2017-01-01 RX ADMIN — INSULIN LISPRO 4 UNITS: 100 INJECTION, SOLUTION INTRAVENOUS; SUBCUTANEOUS at 22:39

## 2017-01-01 RX ADMIN — IPRATROPIUM BROMIDE AND ALBUTEROL SULFATE 3 ML: .5; 3 SOLUTION RESPIRATORY (INHALATION) at 13:11

## 2017-01-01 RX ADMIN — PHENYTOIN SODIUM 100 MG: 50 INJECTION INTRAMUSCULAR; INTRAVENOUS at 15:14

## 2017-01-01 RX ADMIN — GUAIFENESIN 600 MG: 600 TABLET, EXTENDED RELEASE ORAL at 23:00

## 2017-01-01 RX ADMIN — PHENYTOIN SODIUM 100 MG: 50 INJECTION INTRAMUSCULAR; INTRAVENOUS at 18:41

## 2017-01-01 RX ADMIN — GUAIFENESIN 600 MG: 600 TABLET, EXTENDED RELEASE ORAL at 22:39

## 2017-01-01 RX ADMIN — BUDESONIDE 500 MCG: 0.5 INHALANT RESPIRATORY (INHALATION) at 19:05

## 2017-01-01 RX ADMIN — PHENYTOIN SODIUM 100 MG: 50 INJECTION INTRAMUSCULAR; INTRAVENOUS at 14:11

## 2017-01-01 RX ADMIN — INSULIN LISPRO 4 UNITS: 100 INJECTION, SOLUTION INTRAVENOUS; SUBCUTANEOUS at 12:07

## 2017-01-01 RX ADMIN — IPRATROPIUM BROMIDE AND ALBUTEROL SULFATE 3 ML: .5; 3 SOLUTION RESPIRATORY (INHALATION) at 07:19

## 2017-01-01 RX ADMIN — BUDESONIDE 500 MCG: 0.5 INHALANT RESPIRATORY (INHALATION) at 20:37

## 2017-01-01 RX ADMIN — INSULIN LISPRO 2 UNITS: 100 INJECTION, SOLUTION INTRAVENOUS; SUBCUTANEOUS at 06:44

## 2017-01-01 RX ADMIN — ATORVASTATIN CALCIUM 40 MG: 20 TABLET, FILM COATED ORAL at 08:38

## 2017-01-01 RX ADMIN — BUDESONIDE 500 MCG: 0.5 INHALANT RESPIRATORY (INHALATION) at 08:42

## 2017-01-01 RX ADMIN — ISOSORBIDE DINITRATE 30 MG: 10 TABLET ORAL at 08:35

## 2017-01-01 RX ADMIN — VALSARTAN 20 MG: 40 TABLET, FILM COATED ORAL at 10:45

## 2017-01-01 RX ADMIN — LEVOTHYROXINE SODIUM 50 MCG: 50 TABLET ORAL at 07:14

## 2017-01-01 RX ADMIN — PHENYTOIN 100 MG: 50 TABLET, CHEWABLE ORAL at 18:01

## 2017-01-01 RX ADMIN — INSULIN LISPRO 2 UNITS: 100 INJECTION, SOLUTION INTRAVENOUS; SUBCUTANEOUS at 16:54

## 2017-01-01 RX ADMIN — GUAIFENESIN 600 MG: 600 TABLET, EXTENDED RELEASE ORAL at 10:44

## 2017-01-01 RX ADMIN — IPRATROPIUM BROMIDE AND ALBUTEROL SULFATE 3 ML: .5; 3 SOLUTION RESPIRATORY (INHALATION) at 19:24

## 2017-01-01 RX ADMIN — GUAIFENESIN 600 MG: 600 TABLET, EXTENDED RELEASE ORAL at 23:37

## 2017-01-01 RX ADMIN — WARFARIN SODIUM 3 MG: 3 TABLET ORAL at 18:03

## 2017-01-01 RX ADMIN — DIGOXIN 0.12 MG: 0.12 TABLET ORAL at 17:26

## 2017-01-01 RX ADMIN — CARVEDILOL 3.12 MG: 3.12 TABLET, FILM COATED ORAL at 08:36

## 2017-01-01 RX ADMIN — LEVOFLOXACIN 500 MG: 500 TABLET, FILM COATED ORAL at 18:01

## 2017-01-01 RX ADMIN — DEXTROSE MONOHYDRATE 25 ML/HR: 5 INJECTION, SOLUTION INTRAVENOUS at 16:59

## 2017-01-01 RX ADMIN — DOCUSATE SODIUM 100 MG: 100 CAPSULE, LIQUID FILLED ORAL at 11:12

## 2017-01-01 RX ADMIN — BARIUM SULFATE 5 ML: 400 PASTE ORAL at 15:30

## 2017-01-01 RX ADMIN — MORPHINE SULFATE 10 MG: 20 SOLUTION ORAL at 12:57

## 2017-01-01 RX ADMIN — DOCUSATE SODIUM 100 MG: 100 CAPSULE, LIQUID FILLED ORAL at 10:45

## 2017-01-01 RX ADMIN — DIGOXIN 100 MCG: 0.25 INJECTION INTRAMUSCULAR; INTRAVENOUS at 10:45

## 2017-01-01 RX ADMIN — LEVOFLOXACIN 500 MG: 5 INJECTION, SOLUTION INTRAVENOUS at 18:42

## 2017-01-01 RX ADMIN — MORPHINE SULFATE 10 MG: 20 SOLUTION ORAL at 12:10

## 2017-01-01 RX ADMIN — BUDESONIDE 500 MCG: 0.5 INHALANT RESPIRATORY (INHALATION) at 19:14

## 2017-01-01 RX ADMIN — SODIUM PHOSPHATE, DIBASIC AND SODIUM PHOSPHATE, MONOBASIC 118 ML: 7; 19 ENEMA RECTAL at 14:53

## 2017-01-01 RX ADMIN — LEVOFLOXACIN 500 MG: 5 INJECTION, SOLUTION INTRAVENOUS at 18:04

## 2017-01-01 RX ADMIN — WARFARIN SODIUM 4 MG: 4 TABLET ORAL at 19:56

## 2017-01-01 RX ADMIN — GUAIFENESIN 600 MG: 600 TABLET, EXTENDED RELEASE ORAL at 08:38

## 2017-01-01 RX ADMIN — SPIRONOLACTONE 25 MG: 25 TABLET, FILM COATED ORAL at 08:36

## 2017-01-01 RX ADMIN — GUAIFENESIN 600 MG: 600 TABLET, EXTENDED RELEASE ORAL at 22:25

## 2017-01-01 RX ADMIN — BUDESONIDE 500 MCG: 0.5 INHALANT RESPIRATORY (INHALATION) at 07:32

## 2017-01-01 RX ADMIN — IPRATROPIUM BROMIDE AND ALBUTEROL SULFATE 3 ML: .5; 3 SOLUTION RESPIRATORY (INHALATION) at 16:00

## 2017-01-01 RX ADMIN — BUDESONIDE 500 MCG: 0.5 INHALANT RESPIRATORY (INHALATION) at 07:00

## 2017-01-01 RX ADMIN — BUDESONIDE 500 MCG: 0.5 INHALANT RESPIRATORY (INHALATION) at 07:03

## 2017-01-01 RX ADMIN — DIGOXIN 100 MCG: 0.25 INJECTION INTRAMUSCULAR; INTRAVENOUS at 08:48

## 2017-01-01 RX ADMIN — IPRATROPIUM BROMIDE AND ALBUTEROL SULFATE 3 ML: .5; 3 SOLUTION RESPIRATORY (INHALATION) at 05:13

## 2017-01-01 RX ADMIN — PHENYTOIN SODIUM 100 MG: 50 INJECTION INTRAMUSCULAR; INTRAVENOUS at 06:36

## 2017-01-01 RX ADMIN — GUAIFENESIN 600 MG: 600 TABLET, EXTENDED RELEASE ORAL at 14:22

## 2017-01-01 RX ADMIN — CARVEDILOL 3.12 MG: 3.12 TABLET, FILM COATED ORAL at 10:44

## 2017-01-01 RX ADMIN — FUROSEMIDE 20 MG: 10 INJECTION, SOLUTION INTRAMUSCULAR; INTRAVENOUS at 17:17

## 2017-01-01 RX ADMIN — CARVEDILOL 3.12 MG: 3.12 TABLET, FILM COATED ORAL at 16:55

## 2017-01-01 RX ADMIN — BARIUM SULFATE 15 ML: 0.6 SUSPENSION ORAL at 15:29

## 2017-01-01 RX ADMIN — DIGOXIN 100 MCG: 0.25 INJECTION INTRAMUSCULAR; INTRAVENOUS at 10:22

## 2017-01-01 RX ADMIN — CARVEDILOL 3.12 MG: 3.12 TABLET, FILM COATED ORAL at 11:44

## 2017-01-01 RX ADMIN — FUROSEMIDE 40 MG: 10 INJECTION, SOLUTION INTRAMUSCULAR; INTRAVENOUS at 08:38

## 2017-01-01 RX ADMIN — ATORVASTATIN CALCIUM 40 MG: 20 TABLET, FILM COATED ORAL at 10:44

## 2017-01-01 RX ADMIN — PHENYTOIN 100 MG: 50 TABLET, CHEWABLE ORAL at 22:40

## 2017-01-01 RX ADMIN — LEVOTHYROXINE SODIUM 50 MCG: 50 TABLET ORAL at 07:00

## 2017-01-01 RX ADMIN — PHENYTOIN SODIUM 100 MG: 50 INJECTION INTRAMUSCULAR; INTRAVENOUS at 07:42

## 2017-01-01 RX ADMIN — FUROSEMIDE 40 MG: 10 INJECTION, SOLUTION INTRAMUSCULAR; INTRAVENOUS at 08:37

## 2017-01-01 RX ADMIN — MORPHINE SULFATE 10 MG: 20 SOLUTION ORAL at 15:17

## 2017-01-01 RX ADMIN — ISOSORBIDE DINITRATE 30 MG: 10 TABLET ORAL at 08:38

## 2017-01-01 RX ADMIN — VALSARTAN 20 MG: 40 TABLET, FILM COATED ORAL at 10:27

## 2017-01-01 RX ADMIN — PHENYTOIN SODIUM 100 MG: 50 INJECTION INTRAMUSCULAR; INTRAVENOUS at 21:27

## 2017-01-01 RX ADMIN — MORPHINE SULFATE 10 MG: 20 SOLUTION ORAL at 18:27

## 2017-01-01 RX ADMIN — SPIRONOLACTONE 25 MG: 25 TABLET, FILM COATED ORAL at 11:11

## 2017-01-01 RX ADMIN — FUROSEMIDE 10 MG: 10 INJECTION, SOLUTION INTRAMUSCULAR; INTRAVENOUS at 08:23

## 2017-01-01 RX ADMIN — INSULIN LISPRO 6 UNITS: 100 INJECTION, SOLUTION INTRAVENOUS; SUBCUTANEOUS at 17:01

## 2017-01-01 RX ADMIN — DIGOXIN 0.12 MG: 0.12 TABLET ORAL at 11:44

## 2017-01-01 RX ADMIN — POLYETHYLENE GLYCOL 3350 17 G: 17 POWDER, FOR SOLUTION ORAL at 22:40

## 2017-01-01 RX ADMIN — DOCUSATE SODIUM 100 MG: 100 CAPSULE, LIQUID FILLED ORAL at 11:44

## 2017-01-01 RX ADMIN — CARVEDILOL 3.12 MG: 3.12 TABLET, FILM COATED ORAL at 18:02

## 2017-01-01 RX ADMIN — DEXTROSE MONOHYDRATE 50 ML/HR: 5 INJECTION, SOLUTION INTRAVENOUS at 11:11

## 2017-01-01 RX ADMIN — FUROSEMIDE 40 MG: 10 INJECTION, SOLUTION INTRAMUSCULAR; INTRAVENOUS at 22:20

## 2017-01-01 RX ADMIN — IPRATROPIUM BROMIDE AND ALBUTEROL SULFATE 3 ML: .5; 3 SOLUTION RESPIRATORY (INHALATION) at 19:05

## 2017-01-01 RX ADMIN — PHENYTOIN SODIUM 100 MG: 50 INJECTION INTRAMUSCULAR; INTRAVENOUS at 22:19

## 2017-01-01 RX ADMIN — BUDESONIDE 500 MCG: 0.5 INHALANT RESPIRATORY (INHALATION) at 19:36

## 2017-01-01 RX ADMIN — IPRATROPIUM BROMIDE AND ALBUTEROL SULFATE 3 ML: .5; 3 SOLUTION RESPIRATORY (INHALATION) at 07:32

## 2017-01-01 RX ADMIN — BUDESONIDE 500 MCG: 0.5 INHALANT RESPIRATORY (INHALATION) at 19:47

## 2017-01-01 RX ADMIN — IPRATROPIUM BROMIDE AND ALBUTEROL SULFATE 3 ML: .5; 3 SOLUTION RESPIRATORY (INHALATION) at 15:58

## 2017-01-01 RX ADMIN — PHENYTOIN 100 MG: 50 TABLET, CHEWABLE ORAL at 11:12

## 2017-01-01 RX ADMIN — LEVOTHYROXINE SODIUM 50 MCG: 50 TABLET ORAL at 06:37

## 2017-01-01 RX ADMIN — IPRATROPIUM BROMIDE AND ALBUTEROL SULFATE 3 ML: .5; 3 SOLUTION RESPIRATORY (INHALATION) at 06:59

## 2017-01-01 RX ADMIN — DOCUSATE SODIUM 100 MG: 100 CAPSULE, LIQUID FILLED ORAL at 11:31

## 2017-01-01 RX ADMIN — CARVEDILOL 3.12 MG: 3.12 TABLET, FILM COATED ORAL at 08:38

## 2017-01-01 RX ADMIN — IPRATROPIUM BROMIDE AND ALBUTEROL SULFATE 3 ML: .5; 3 SOLUTION RESPIRATORY (INHALATION) at 19:45

## 2017-01-01 RX ADMIN — DIGOXIN 0.25 MG: 0.25 TABLET ORAL at 17:43

## 2017-01-01 RX ADMIN — IPRATROPIUM BROMIDE AND ALBUTEROL SULFATE 3 ML: .5; 3 SOLUTION RESPIRATORY (INHALATION) at 07:20

## 2017-01-01 RX ADMIN — DIGOXIN 0.12 MG: 0.12 TABLET ORAL at 17:14

## 2017-01-01 RX ADMIN — FUROSEMIDE 40 MG: 10 INJECTION, SOLUTION INTRAMUSCULAR; INTRAVENOUS at 10:21

## 2017-01-01 RX ADMIN — PHENYTOIN SODIUM 100 MG: 50 INJECTION INTRAMUSCULAR; INTRAVENOUS at 21:21

## 2017-01-01 RX ADMIN — IPRATROPIUM BROMIDE AND ALBUTEROL SULFATE 3 ML: .5; 3 SOLUTION RESPIRATORY (INHALATION) at 16:06

## 2017-01-01 RX ADMIN — SODIUM CHLORIDE 250 ML/HR: 900 INJECTION, SOLUTION INTRAVENOUS at 11:08

## 2017-01-01 RX ADMIN — MORPHINE SULFATE 10 MG: 20 SOLUTION ORAL at 14:37

## 2017-01-01 RX ADMIN — BUDESONIDE 500 MCG: 0.5 INHALANT RESPIRATORY (INHALATION) at 07:19

## 2017-01-01 RX ADMIN — INSULIN LISPRO 2 UNITS: 100 INJECTION, SOLUTION INTRAVENOUS; SUBCUTANEOUS at 23:00

## 2017-01-01 RX ADMIN — GUAIFENESIN 600 MG: 600 TABLET, EXTENDED RELEASE ORAL at 22:20

## 2017-01-01 RX ADMIN — INSULIN LISPRO 2 UNITS: 100 INJECTION, SOLUTION INTRAVENOUS; SUBCUTANEOUS at 16:56

## 2017-01-01 RX ADMIN — LEVOFLOXACIN 500 MG: 500 TABLET, FILM COATED ORAL at 16:55

## 2017-01-01 RX ADMIN — IPRATROPIUM BROMIDE AND ALBUTEROL SULFATE 3 ML: .5; 3 SOLUTION RESPIRATORY (INHALATION) at 11:35

## 2017-01-01 RX ADMIN — SPIRONOLACTONE 25 MG: 25 TABLET, FILM COATED ORAL at 11:44

## 2017-01-01 RX ADMIN — WARFARIN SODIUM 4 MG: 4 TABLET ORAL at 17:26

## 2017-01-01 RX ADMIN — IPRATROPIUM BROMIDE AND ALBUTEROL SULFATE 3 ML: .5; 3 SOLUTION RESPIRATORY (INHALATION) at 08:42

## 2017-01-01 RX ADMIN — LEVOTHYROXINE SODIUM 50 MCG: 50 TABLET ORAL at 08:37

## 2017-01-01 RX ADMIN — ATORVASTATIN CALCIUM 40 MG: 20 TABLET, FILM COATED ORAL at 08:35

## 2017-01-01 RX ADMIN — BUDESONIDE 500 MCG: 0.5 INHALANT RESPIRATORY (INHALATION) at 19:46

## 2017-01-01 RX ADMIN — BUDESONIDE 500 MCG: 0.5 INHALANT RESPIRATORY (INHALATION) at 07:15

## 2017-01-01 RX ADMIN — BUDESONIDE 500 MCG: 0.5 INHALANT RESPIRATORY (INHALATION) at 19:24

## 2017-01-01 RX ADMIN — EXTENDED PHENYTOIN SODIUM 300 MG: 100 CAPSULE ORAL at 23:37

## 2017-01-01 RX ADMIN — LEVOTHYROXINE SODIUM 50 MCG: 50 TABLET ORAL at 06:41

## 2017-01-01 RX ADMIN — POLYETHYLENE GLYCOL 3350 17 G: 17 POWDER, FOR SOLUTION ORAL at 11:44

## 2017-01-01 RX ADMIN — PHENYTOIN 100 MG: 50 TABLET, CHEWABLE ORAL at 16:55

## 2017-01-01 RX ADMIN — INSULIN LISPRO 4 UNITS: 100 INJECTION, SOLUTION INTRAVENOUS; SUBCUTANEOUS at 12:14

## 2017-01-01 RX ADMIN — PHENYTOIN 100 MG: 50 TABLET, CHEWABLE ORAL at 22:58

## 2017-01-01 RX ADMIN — DIGOXIN 100 MCG: 0.25 INJECTION INTRAMUSCULAR; INTRAVENOUS at 18:41

## 2017-01-01 RX ADMIN — LEVOFLOXACIN 500 MG: 5 INJECTION, SOLUTION INTRAVENOUS at 16:54

## 2017-01-02 PROBLEM — J84.10 PULMONARY FIBROSIS (HCC): Status: ACTIVE | Noted: 2017-01-01

## 2017-04-02 PROBLEM — J81.0 ACUTE PULMONARY EDEMA (HCC): Status: ACTIVE | Noted: 2017-01-01

## 2017-04-02 NOTE — PROGRESS NOTES
Pharmacy Dosing Services:  Warfarin    Consult for Warfarin Dosing by Pharmacy by Dr. Jurline Klinefelter provided for this 80 y.o.  male , for indication of Atrial Fibrillation. Dose to achieve an INR goal of 2-3    Order entered for  Warfarin  4 (mg) ordered to be given today at 18:00. LABS    PT/INR Lab Results   Component Value Date/Time    INR 2.4 04/02/2017 03:40 AM      Platelets Lab Results   Component Value Date/Time    PLATELET 118 91/64/8432 03:40 AM      H/H     Lab Results   Component Value Date/Time    HGB 12.0 04/02/2017 03:40 AM        Warfarin Administrations (last 168 hours)     None          Pharmacy to follow daily and will provide subsequent Warfarin dosing based on clinical status.   Cara Crew, [address] [facility]     Contact information [phone]

## 2017-04-02 NOTE — ROUTINE PROCESS
TRANSFER - OUT REPORT:    Verbal report given to [facility] RN(name) on Madi Dias  being transferred to tele(unit) for routine progression of care       Report consisted of patients Situation, Background, Assessment and   Recommendations(SBAR). Information from the following report(s) SBAR, ED Summary and MAR was reviewed with the receiving nurse. Lines:   Peripheral IV 04/02/17 Left Antecubital (Active)   Site Assessment Clean, dry, & intact 4/2/2017  3:43 AM   Phlebitis Assessment 0 4/2/2017  3:43 AM   Infiltration Assessment 0 4/2/2017  3:43 AM   Dressing Status Clean, dry, & intact 4/2/2017  3:43 AM        Opportunity for questions and clarification was provided.       Patient transported with:   Monitor  O2 @ 2 liters  Tech

## 2017-04-02 NOTE — ED NOTES
Pt resting comfortable on stretcher, bed in low position, call bell within reach, side rails up, pt is sleeping but easily awakens, wife at bedside, no needs expressed at this time

## 2017-04-02 NOTE — ED PROVIDER NOTES
HPI Comments:   5:17 AM   Aurora Tam is a 80 y.o. male with hx of PNA 3 months ago hospitalized, presents to ED C/O wet cough onset yesterday, worsening today. Per wife, pt has a breathing treatment at home. Pt received a flu shot this year. Wife states pt tried Mucinex without relief. PCP is Dr. Roslyn Leija and Dr. Karla Hewitt (cardiologist). Pt states he does not use oxygen at home. Pt was a tobacco user and quit 18 years ago. Denies EtOH use. FHx include cardiac problems. Other PMHx include CAD, sz, HTN, high cholesterol, kidney stones, stroke and heart failure. PSHx include kidney stone removal and aneurysm repair. Pt denies known sick contacts, fever, chills, vomiting, diarrhea or any other sxs or complaints. The history is provided by the patient and the spouse. No  was used. Written by LUCIANA Turcios, as dictated by Pallavi Knox MD    Past Medical History:   Diagnosis Date    CAD (coronary artery disease)     Heart failure ([facility] [address]     High cholesterol     Hypertension     Kidney stone     Seizure ([facility] [address]     Stroke [facility])        Past Surgical History:   Procedure Laterality Date    CARDIAC SURG PROCEDURE UNLIST      aneurysm repair    HX UROLOGICAL      kidney stone removed         History reviewed. No pertinent family history. Social History     Social History    Marital status:      Spouse name: N/A    Number of children: N/A    Years of education: N/A     Occupational History    Not on file. Social History Main Topics    Smoking status: Former Smoker    Smokeless tobacco: Not on file    Alcohol use No    Drug use: Not on file    Sexual activity: No     Other Topics Concern    Not on file     Social History Narrative         ALLERGIES: Review of patient's allergies indicates no known allergies. Review of Systems   Constitutional: Negative for chills and fever. Respiratory: Positive for cough.     Gastrointestinal: Negative for diarrhea and vomiting. All other systems reviewed and are negative. Vitals:    04/02/17 0615 04/02/17 0630 04/02/17 0659 04/02/17 0700   BP:  97/61  97/51   Pulse: 68 68  70   Resp: 19 22  21   Temp:       SpO2: 96%  96% 97%   Weight:       Height:                Physical Exam   Constitutional: He is oriented to person, place, and time. He appears well-developed and well-nourished. No distress. elderly man. Disheveled . Appearing older than stated age. fatigued in appearance with increased breathing   HENT:   Head: Normocephalic and atraumatic. Right Ear: External ear normal.   Left Ear: External ear normal.   Mouth/Throat: Oropharynx is clear and moist and mucous membranes are normal. No oropharyngeal exudate. Eyes: Conjunctivae and EOM are normal. Pupils are equal, round, and reactive to light. No scleral icterus. No pallor. No icterus. Neck: Normal range of motion. Neck supple. No JVD present. No tracheal deviation present. No thyromegaly present. Cardiovascular: Normal rate and normal heart sounds. An irregularly irregular rhythm present. aortic murmur heard best left upper peristernum border, no axillary murmur   Pulmonary/Chest: Effort normal. No stridor. No respiratory distress. Lungs: very wet sounding with simple, normal respiratory effort   Abdominal: Soft. Bowel sounds are normal. He exhibits no distension. There is no tenderness. There is no rebound and no guarding. Musculoskeletal: Normal range of motion. He exhibits no edema or tenderness. No soft tissue injuries   Lymphadenopathy:     He has no cervical adenopathy. Neurological: He is alert and oriented to person, place, and time. He has normal reflexes. No cranial nerve deficit. Coordination normal.   Slightly confused. Skin: Skin is warm and dry. No rash noted. He is not diaphoretic. No erythema. Decreased skin turgor. Psychiatric: He has a normal mood and affect.  His behavior is normal. Judgment and thought content normal.   slightly diminished cognition but otherwise unremarkable. Nursing note and vitals reviewed. RESULTS:  XR CHEST PORT    (Results Pending)        Labs Reviewed   CBC WITH AUTOMATED DIFF - Abnormal; Notable for the following:        Result Value    WBC 14.3 (*)     RBC 3.84 (*)     HGB 12.0 (*)     RDW 14.7 (*)     PLATELET 572 (*)     MONOCYTES 19 (*)     ABS. LYMPHOCYTES 3.7 (*)     ABS. MONOCYTES 2.6 (*)     ABS.  BASOPHILS 0.1 (*)     All other components within normal limits   METABOLIC PANEL, COMPREHENSIVE - Abnormal; Notable for the following:     Glucose 129 (*)     BUN 20 (*)     Creatinine 1.34 (*)     GFR est non-AA 51 (*)     Calcium 8.3 (*)     Albumin 2.8 (*)     Globulin 4.3 (*)     A-G Ratio 0.7 (*)     All other components within normal limits   PRO-BNP - Abnormal; Notable for the following:     NT pro-BNP 4374 (*)     All other components within normal limits   PROTHROMBIN TIME + INR - Abnormal; Notable for the following:     Prothrombin time 25.1 (*)     INR 2.4 (*)     All other components within normal limits   PTT - Abnormal; Notable for the following:     aPTT 51.0 (*)     All other components within normal limits   PHENYTOIN - Abnormal; Notable for the following:     Phenytoin 8.3 (*)     All other components within normal limits   POC G3 - Abnormal; Notable for the following:     pCO2 (POC) 49.3 (*)     pO2 (POC) 113 (*)     HCO3 (POC) 30.7 (*)     sO2 (POC) 98 (*)     All other components within normal limits   CULTURE, BLOOD   LACTIC ACID, PLASMA   CARDIAC PANEL,(CK, CKMB & TROPONIN)   DIGOXIN   BLOOD GAS, ARTERIAL       Recent Results (from the past 12 hour(s))   CBC WITH AUTOMATED DIFF    Collection Time: 04/02/17  3:40 AM   Result Value Ref Range    WBC 14.3 (H) 4.6 - 13.2 K/uL    RBC 3.84 (L) 4.70 - 5.50 M/uL    HGB 12.0 (L) 13.0 - 16.0 g/dL    HCT 36.1 36.0 - 48.0 %    MCV 94.0 74.0 - 97.0 FL    MCH 31.3 24.0 - 34.0 PG    MCHC 33.2 31.0 - 37.0 g/dL    RDW 14.7 (H) 11.6 - 14.5 %    PLATELET 246 (L) 627 - 420 K/uL    MPV 10.0 9.2 - 11.8 FL    NEUTROPHILS 53 40 - 73 %    LYMPHOCYTES 26 21 - 52 %    MONOCYTES 19 (H) 3 - 10 %    EOSINOPHILS 1 0 - 5 %    BASOPHILS 1 0 - 2 %    ABS. NEUTROPHILS 7.6 1.8 - 8.0 K/UL    ABS. LYMPHOCYTES 3.7 (H) 0.9 - 3.6 K/UL    ABS. MONOCYTES 2.6 (H) 0.05 - 1.2 K/UL    ABS. EOSINOPHILS 0.2 0.0 - 0.4 K/UL    ABS. BASOPHILS 0.1 (H) 0.0 - 0.06 K/UL    DF AUTOMATED     METABOLIC PANEL, COMPREHENSIVE    Collection Time: 04/02/17  3:40 AM   Result Value Ref Range    Sodium 138 136 - 145 mmol/L    Potassium 3.7 3.5 - 5.5 mmol/L    Chloride 101 100 - 108 mmol/L    CO2 31 21 - 32 mmol/L    Anion gap 6 3.0 - 18 mmol/L    Glucose 129 (H) 74 - 99 mg/dL    BUN 20 (H) 7.0 - 18 MG/DL    Creatinine 1.34 (H) 0.6 - 1.3 MG/DL    BUN/Creatinine ratio 15 12 - 20      GFR est AA >60 >60 ml/min/1.73m2    GFR est non-AA 51 (L) >60 ml/min/1.73m2    Calcium 8.3 (L) 8.5 - 10.1 MG/DL    Bilirubin, total 0.5 0.2 - 1.0 MG/DL    ALT (SGPT) 18 16 - 61 U/L    AST (SGOT) 21 15 - 37 U/L    Alk.  phosphatase 71 45 - 117 U/L    Protein, total 7.1 6.4 - 8.2 g/dL    Albumin 2.8 (L) 3.4 - 5.0 g/dL    Globulin 4.3 (H) 2.0 - 4.0 g/dL    A-G Ratio 0.7 (L) 0.8 - 1.7     LACTIC ACID, PLASMA    Collection Time: 04/02/17  3:40 AM   Result Value Ref Range    Lactic acid 0.9 0.4 - 2.0 MMOL/L   CULTURE, BLOOD    Collection Time: 04/02/17  3:40 AM   Result Value Ref Range    Special Requests: NO SPECIAL REQUESTS      Culture result: NO GROWTH AFTER 2 HOURS     PRO-BNP    Collection Time: 04/02/17  3:40 AM   Result Value Ref Range    NT pro-BNP 4374 (H) 0 - 1800 PG/ML   CARDIAC PANEL,(CK, CKMB & TROPONIN)    Collection Time: 04/02/17  3:40 AM   Result Value Ref Range    CK 50 39 - 308 U/L    CK - MB <1.0 <3.6 ng/ml    CK-MB Index Cannot be calulated 0.0 - 4.0 %    Troponin-I, Qt. 0.06 0.00 - 0.06 NG/ML   PROTHROMBIN TIME + INR    Collection Time: 04/02/17  3:40 AM   Result Value Ref Range    Prothrombin time 25.1 (H) 11.5 - 15.2 sec    INR 2.4 (H) 0.8 - 1.2     PTT    Collection Time: 04/02/17  3:40 AM   Result Value Ref Range    aPTT 51.0 (H) 23.0 - 36.4 SEC   DIGOXIN    Collection Time: 04/02/17  3:40 AM   Result Value Ref Range    DIGOXIN 1.0 0.9 - 2.0 NG/ML   PHENYTOIN    Collection Time: 04/02/17  3:40 AM   Result Value Ref Range    Phenytoin 8.3 (L) 10.0 - 20 ug/mL   POC G3    Collection Time: 04/02/17  6:57 AM   Result Value Ref Range    Device: NASAL CANNULA      Flow rate (POC) 3 L/M    FIO2 (POC) 0.32 %    pH (POC) 7.403 7.35 - 7.45      pCO2 (POC) 49.3 (H) 35.0 - 45.0 MMHG    pO2 (POC) 113 (H) 80 - 100 MMHG    HCO3 (POC) 30.7 (H) 22 - 26 MMOL/L    sO2 (POC) 98 (H) 92 - 97 %    Base excess (POC) 6 mmol/L    Allens test (POC) N/A      Total resp. rate 25      Site RIGHT RADIAL      Patient temp. 37.0      Specimen type (POC) ARTERIAL      Performed by Young OCONNELL  Number of Diagnoses or Management Options  Acute on chronic congestive heart failure, unspecified congestive heart failure type [facility]):   Acute pulmonary edema [facility]):   Chronic atrial fibrillation ([facility] [address]):   CKD (chronic kidney disease), stage 3 (moderate): History of CVA (cerebrovascular accident):   Hypoxia:   Seizure disorder [facility]):   Diagnosis management comments: DDX: PNA, COPD, emphysema, CHF, PE.  These sxs most likely respiratory distress from cardiac hx     Hypoxia easily addressed but VS were otherwise stable        Amount and/or Complexity of Data Reviewed  Clinical lab tests: reviewed and ordered  Tests in the radiology section of CPT®: reviewed and ordered (CXR)  Tests in the medicine section of CPT®: ordered and reviewed  Decide to obtain previous medical records or to obtain history from someone other than the patient: yes  Obtain history from someone other than the patient: yes (wife)  Review and summarize past medical records: yes  Discuss the patient with other providers: yes Simba Diaz MD (hospitalist))  Independent visualization of images, tracings, or specimens: yes (CXR)    Risk of Complications, Morbidity, and/or Mortality  Presenting problems: high  Diagnostic procedures: high  Management options: high    Critical Care  Total time providing critical care: 30-74 minutes    Patient Progress  Patient progress: improved    ED Course     Medications   furosemide (LASIX) injection 80 mg (80 mg IntraVENous Given 4/2/17 0621)   albuterol-ipratropium (DUO-NEB) 2.5 MG-0.5 MG/3 ML (3 mL Nebulization Given 4/2/17 0659)       Procedures  PROGRESS NOTE:  5:17 AM  Initial assessment performed. Written by Marcello Peters ED Scribe, as dictated by Raquel Mckinney MD    CONSULT NOTE:   8:01 AM  Birgit Cid. Shai Mckinney MD spoke with Rin Menendez MD   Specialty: hospitalist  Discussed pt's hx, disposition, and available diagnostic and imaging results. Reviewed care plans. Consulting physician agrees with plans as outlined. He agrees to admit pt to telemetry. Written by Marcello Peters ED Scribe, as dictated by Raquel Mckinney MD    ADMISSION NOTE:  8:01 AM  Patient is being admitted to the hospital by Rin Menendez MD. The results of their tests and reasons for their admission have been discussed with them and/or available family. They convey agreement and understanding for the need to be admitted and for their admission diagnosis. Written by LUCIANA Fergusonibdamon, as dictated by Raquel Mckinney MD.    CONDITIONS ON ADMISSION:  Deep Vein Thrombosis is not present at the time of admission. Thrombosis is not present at the time of admission. Urinary Tract Infection is not present at the time of admission. Pneumonia is not present at the time of admission. MRSA is not present at the time of admission. Wound infection is not present at the time of admission. Pressure Ulcer is not present at the time of admission. CLINICAL IMPRESSION    1. Acute pulmonary edema (HCC)    2.  Acute on chronic congestive heart failure, unspecified congestive heart failure type ([facility] [address]    3. Hypoxia    4. Seizure disorder ([facility] [address]    5. History of CVA (cerebrovascular accident)    6. CKD (chronic kidney disease), stage 3 (moderate)    7. Chronic atrial fibrillation [facility])          ATTESTATIONS:  This note is prepared by Jeanine Donovan, acting as Scribe for Camron Vogt. Eliana Castro MD.    Camron Vogt. Eliana Castro MD: The scribe's documentation has been prepared under my direction and personally reviewed by me in its entirety. I confirm that the note above accurately reflects all work, treatment, procedures, and medical decision making performed by me. CRITICAL CARE NOTE:  2:52 PM  I have spent 45  minutes of critical care time involved in lab review, consultations with specialist, family decision-making, and documentation. During this entire length of time I was immediately available to the patient. Critical Care: The reason for providing this level of medical care for this critically ill patient was due a critical illness that impaired one or more vital organ systems such that there was a high probability of imminent or life threatening deterioration in the patients condition. This care involved high complexity decision making to assess, manipulate, and support vital system functions, to treat this degreee vital organ system failure and to prevent further life threatening deterioration of the patients condition.

## 2017-04-02 NOTE — ED NOTES
[facility]side [facility] from [facility], wife at bedside, pt resting on stretcher with bed in low position, call bell within reach, side rails up, pt states breathing easier after breathing treatment.

## 2017-04-02 NOTE — ROUTINE PROCESS
TRANSFER - IN REPORT:    Verbal report received from VIANEY Weir(name) on Lenin Calloway  being received from ED(unit) for routine progression of care      Report consisted of patients Situation, Background, Assessment and   Recommendations(SBAR). Information from the following report(s) SBAR, Kardex, ED Summary, Intake/Output, MAR, Recent Results and Cardiac Rhythm afib was reviewed with the receiving nurse. Opportunity for questions and clarification was provided. Assessment completed upon patients arrival to unit and care assumed.

## 2017-04-02 NOTE — ED NOTES
Medicated per MD orders. Spouse reports that pt is incontinent. MD does not want pt cathed for urine and states that UA is not necessary at this time. UA cancelled. Pt placed on pad in case of incontinence. Moves well in bed without assistance.

## 2017-04-02 NOTE — ED NOTES
Pt ate lunch without difficulty, wife remains at bedside, bed in low position, call bell within reach, side rails up, no needs expressed at present

## 2017-04-02 NOTE — PROGRESS NOTES
0 Pt arrived on unit from the ED via stretcher with wife at side. VSS, pt is alert and oriented x4, lungs are coarse on 2l nc, pt has a weak productive cough. Pt is afib on the monitor. Wife states pt has had speech difficulty and dysphagia since his last stroke in 2007. Pt denies pain, but reports some SOB. Pt has been oriented to the room, call bell has been left within reach, and pt and wife deny any further needs at this time. 1600 Paged Dr. Renate Simon and recommended change to be made to pt's diet order due to dysphagia and speech difficulty. Orders received to switch to dysphagia diet and to perform bedside swallow evaluation. If pt does not pass bedside swallow eval, to place pt NPO.     1630 Paged Dr. Renate Simon to inform him changes were made to PTA med list. MD made changes. 1700 Performed bedside swallow evaluation with pt. Pt did not tolerate water in a cup. Pt had difficulty swallowing and coughed several times. Placed pt NPO per MD's request.     3638 Paged MD due to pt's BP 78/38. Pt is symptomatic. Orders received to give a one time 500ml NS bolus. Shift Summary-  Pt was oriented to the unit, med pta list completed with MD, and pt placed NPO until further evaluation is completed. Will endorse to oncoming nurse to monitor hypotension and to recheck BP after bolus is completed. Bedside and Verbal shift change report given to RAJ Lundberg (oncoming nurse) by Carol Covarrubias   (offgoing nurse). Report included the following information SBAR, Kardex, ED Summary, Intake/Output, MAR, Recent Results and Cardiac Rhythm afib.

## 2017-04-02 NOTE — ED NOTES
Pt urinated in bed, pt changed and bed remade, wife remains at bedside, no needs expressed at this time, pt has area on buttocks that is red but no break down at this time, wife states it appears increasingly red

## 2017-04-02 NOTE — H&P
History & Physical    Patient: Angeles Osborn MRN: [id number]  CSN: [id number]    YOB: 1933  Age: 80 y.o. Sex: male      DOA: 4/2/2017  Primary Care Provider:  Anil Aldana MD      Assessment/Plan     Patient Active Problem List   Diagnosis Code    Chronic systolic heart failure (HCC) I50.22    A-fib (HCC) I48.91    Type II or unspecified type diabetes mellitus without mention of complication, not stated as uncontrolled E11.9    History of CVA (cerebrovascular accident) Z80.78    CKD (chronic kidney disease) N18.9    Seizure disorder ([facility] G40.909    Dysphagia R13.10    CHF (congestive heart failure) ([facility] I50.9    Acute respiratory failure with hypercapnia ([facility]) J96.02    Aspiration pneumonia ([facility] J69.0    Pulmonary fibrosis ([facility] J84.10    Acute pulmonary edema ([facility] J81.0       Active hospital problems:  1. Acute hypoxic respiratory failure due to CHF exacerbation  2. Epilepsy  3. Chronic thrombocytopenia  4. Atrial fibrillation    Cardiovascular: Admit patient to telemetry for serial cardiac enzymes, cardiac monitoring and continued diuresis with Lasix 40 mg IV twice daily. Continue atrial fibrillation medications: carvedilol, digoxin and warfarin with pharmacy dosing. Check dig level. Repeat echocardiogram.    Pulmonary: respiratory failure due to #1. Will treat as above. Renal: no active issues    Endocrinology: continue Synthroid 50 µg    CNS: continue seizure meds which were reviewed with patient. Patient takes Dilantin ER capsules 300 mg at bedtime. This was updated in the medical record. Infectious disease: leukocytosis of unclear etiology. Monitor for now. No other symptoms of infection to include fevers, chills. DVT prophylaxis: Warfarin per pharmacy dosing    Code Status: Discussed patient wishes in the event of cardiac or respiratory arrest or failure and he would like to be DNR/DNI            CC: CHF exacerbation       HPI:     Angeles Osborn is a 80 y. o. male who has a past medical history significant for atrial fibrillation, pulmonary fibrosis, seizure disorder, history of CVA, hypertension, hyperlipidemia and coronary artery disease presenting with a one-day history worsening cough since yesterday and shortness of breath. On arrival he was afebrile, pulse 90, blood pressure 105/46, respirations 22 with oxygen saturations of 88% on room air which rapidly improved to 95% on 3 L by nasal cannula. Laboratory workup was significant for white blood cell count 14.3, mild anemia with hemoglobin 12.0 and chronic thrombocytopenia at 1:01. His INR was 2.4, glucose 129, creatinine 1.34 and mildly elevated troponin 0.06 with a pro BNP of 4374 which is elevated from his baseline. A chest x-ray showed both cardiomegaly and pulmonary edema. An echocardiogram from December of last year showed an ejection fraction between 45 and 50% with concentric left ventricular hypertrophy, dilated bilateral atria with no additional consequential findings. In the ER he was given Lasix 80 mg times one, breathing treatments and subsequently hospital medicine was contacted for admission to the hospital and further management. Past Medical History:   Diagnosis Date    Atrial fibrillation ([facility]     CAD (coronary artery disease)     Heart failure (HCC)     High cholesterol     Hypertension     Kidney stone     Seizure ([facility]     Stroke [facility])        Past Surgical History:   Procedure Laterality Date    CARDIAC SURG PROCEDURE UNLIST      aneurysm repair    HX UROLOGICAL      kidney stone removed       History reviewed. No pertinent family history.     Social History     Social History    Marital status:      Spouse name: N/A    Number of children: N/A    Years of education: N/A     Social History Main Topics    Smoking status: Former Smoker    Smokeless tobacco: None    Alcohol use No    Drug use: None    Sexual activity: No     Other Topics Concern    None     Social History Narrative       Prior to Admission medications    Medication Sig Start Date End Date Taking? Authorizing Provider   spironolactone (ALDACTONE) 25 mg tablet Take 25 mg by mouth daily. Yes Alton Cisse MD   carvedilol (COREG) 6.25 mg tablet Take 6.25 mg by mouth two (2) times daily (with meals). Yes Alton Cisse MD   isosorbide dinitrate (ISORDIL) 30 mg tablet Take 20 mg by mouth daily. Gael Cisse MD   guaiFENesin SR (MUCINEX) 600 mg SR tablet Take 1 Tab by mouth every twelve (12) hours. 1/4/17  Yes Cassidy Lei MD   furosemide (LASIX) 40 mg tablet Take 1 Tab by mouth daily. 1/2/17  Yes Cassidy Lei MD   warfarin (COUMADIN) 6 mg tablet Take 6 mg by mouth every Tuesday and Thursday. Yes Alton Cisse MD   atorvastatin (LIPITOR) 80 mg tablet Take 40 mg by mouth nightly. Yes Alton Cisse MD   levothyroxine (SYNTHROID) 50 mcg tablet Take 50 mcg by mouth Daily (before breakfast). Yes Alton Cisse MD   cholecalciferol, vitamin D3, 2,000 unit tab Take 50,000 Units by mouth every seven (7) days. Yes Alton Cisse MD   cholecalciferol, vitamin D3, 4,000 unit cap Take 4,000 Units by mouth two (2) times a day. Yes Alton Cisse MD   warfarin (COUMADIN) 2 mg tablet Take 1.5 Tabs by mouth daily. Daily, except Tuesday and Friday  Patient taking differently: Take 4 mg by mouth four (4) days a week. Monday, Wednesday, Friday, Saturday,Sunday 12/9/12  Yes Cassidy Lei MD   PHENYTOIN SODIUM EXTENDED (DILANTIN PO) Take 300 mg by mouth daily. Yes Alton Cisse MD   valsartan (DIOVAN) 160 mg tablet Take 160 mg by mouth nightly. Gael Cisse MD   digoxin (LANOXIN) 0.125 mg tablet Take 0.125 mg by mouth every Tuesday, Thursday, Saturday & Sunday. Yes Phys Other, MD   lisinopril (PRINIVIL, ZESTRIL) 2.5 mg tablet Take 1 Tab by mouth daily. 1/2/17   Cassidy Lei MD   budesonide (PULMICORT) 0.5 mg/2 mL nbsp 2 mL by Nebulization route two (2) times a day.  1/2/17   Cassidy Lei MD   albuterol-ipratropium (Izola Repress) 2.5 mg-0.5 mg/3 ml nebulizer solution 3 mL by Nebulization route every four (4) hours as needed. 16   Linda Solomon MD   digoxin (LANOXIN) 0.25 mg tablet Take 0.25 mg by mouth every Monday, Wednesday, Friday. Historical Provider   phenytoin (DILANTIN ER) 200 mg ER capsule Take 200 mg by mouth every other day. Historical Provider       No Known Allergies    Review of Systems  Gen: No fever, chills, malaise, weight loss/gain. Heent: No headache, rhinorrhea, epistaxis, ear pain, hearing loss, sinus pain, neck pain/stiffness, sore throat. Heart: No chest pain, palpitations, VITALE, pnd, or orthopnea. Resp: + cough, no hemoptysis, wheezing + shortness of breath. GI: No nausea, vomiting, diarrhea, constipation, melena or hematochezia. : No urinary obstruction, dysuria or hematuria. Derm: No rash, new skin lesion or pruritis. Musc/skeletal: no bone or joint complains. Vasc: No edema, cyanosis or claudication. Endo: No heat/cold intolerance, no polyuria,polydipsia or polyphagia. Neuro: No unilateral weakness, numbness, tingling. Known h/o seizures. Heme: No easy bruising or bleeding. Physical Exam:     Physical Exam:  Visit Vitals    /67 (BP 1 Location: Left arm, BP Patient Position: At rest)    Pulse 67    Temp 98 °F (36.7 °C)    Resp 18    Ht 5' 9\" (1.753 m)    Wt 59.4 kg (131 lb)    SpO2 99%    BMI 19.35 kg/m2    O2 Flow Rate (L/min): 1 l/min O2 Device: Room air    Temp (24hrs), Av.4 °F (36.9 °C), Min:98 °F (36.7 °C), Max:98.7 °F (37.1 °C)             General:  Awake, cachectic, chronically ill appearing but cooperative, mod distress. Head:  Normocephalic, without obvious abnormality, atraumatic. Eyes:  Conjunctivae/corneas clear, sclera anicteric, PERRL, EOMs intact. Nose: Nares normal. No drainage or sinus tenderness. Throat: Lips, mucosa, and tongue normal.    Neck: Supple, symmetrical, trachea midline, no adenopathy.        Lungs:   Clear to auscultation bilaterally. Heart:  Regular rate and rhythm, S1, S2 normal, no murmur, click, rub or gallop. Abdomen: Soft, non-tender. Bowel sounds normal. No masses,  No organomegaly. Extremities: Extremities normal, atraumatic, no cyanosis or edema. Capillary refill normal.   Pulses: 2+ and symmetric all extremities. Skin: Skin color pale, turgor normal. No rashes or lesions   Neurologic: CNII-XII intact. No focal motor or sensory deficit. Labs Reviewed: All lab results for the last 24 hours reviewed. Recent Results (from the past 24 hour(s))   CBC WITH AUTOMATED DIFF    Collection Time: 04/02/17  3:40 AM   Result Value Ref Range    WBC 14.3 (H) 4.6 - 13.2 K/uL    RBC 3.84 (L) 4.70 - 5.50 M/uL    HGB 12.0 (L) 13.0 - 16.0 g/dL    HCT 36.1 36.0 - 48.0 %    MCV 94.0 74.0 - 97.0 FL    MCH 31.3 24.0 - 34.0 PG    MCHC 33.2 31.0 - 37.0 g/dL    RDW 14.7 (H) 11.6 - 14.5 %    PLATELET 789 (L) 829 - 420 K/uL    MPV 10.0 9.2 - 11.8 FL    NEUTROPHILS 53 40 - 73 %    LYMPHOCYTES 26 21 - 52 %    MONOCYTES 19 (H) 3 - 10 %    EOSINOPHILS 1 0 - 5 %    BASOPHILS 1 0 - 2 %    ABS. NEUTROPHILS 7.6 1.8 - 8.0 K/UL    ABS. LYMPHOCYTES 3.7 (H) 0.9 - 3.6 K/UL    ABS. MONOCYTES 2.6 (H) 0.05 - 1.2 K/UL    ABS. EOSINOPHILS 0.2 0.0 - 0.4 K/UL    ABS. BASOPHILS 0.1 (H) 0.0 - 0.06 K/UL    DF AUTOMATED     METABOLIC PANEL, COMPREHENSIVE    Collection Time: 04/02/17  3:40 AM   Result Value Ref Range    Sodium 138 136 - 145 mmol/L    Potassium 3.7 3.5 - 5.5 mmol/L    Chloride 101 100 - 108 mmol/L    CO2 31 21 - 32 mmol/L    Anion gap 6 3.0 - 18 mmol/L    Glucose 129 (H) 74 - 99 mg/dL    BUN 20 (H) 7.0 - 18 MG/DL    Creatinine 1.34 (H) 0.6 - 1.3 MG/DL    BUN/Creatinine ratio 15 12 - 20      GFR est AA >60 >60 ml/min/1.73m2    GFR est non-AA 51 (L) >60 ml/min/1.73m2    Calcium 8.3 (L) 8.5 - 10.1 MG/DL    Bilirubin, total 0.5 0.2 - 1.0 MG/DL    ALT (SGPT) 18 16 - 61 U/L    AST (SGOT) 21 15 - 37 U/L    Alk.  phosphatase 71 45 - 117 U/L    Protein, total 7.1 6.4 - 8.2 g/dL    Albumin 2.8 (L) 3.4 - 5.0 g/dL    Globulin 4.3 (H) 2.0 - 4.0 g/dL    A-G Ratio 0.7 (L) 0.8 - 1.7     LACTIC ACID, PLASMA    Collection Time: 04/02/17  3:40 AM   Result Value Ref Range    Lactic acid 0.9 0.4 - 2.0 MMOL/L   CULTURE, BLOOD    Collection Time: 04/02/17  3:40 AM   Result Value Ref Range    Special Requests: NO SPECIAL REQUESTS      Culture result: NO GROWTH AFTER 2 HOURS     PRO-BNP    Collection Time: 04/02/17  3:40 AM   Result Value Ref Range    NT pro-BNP 4374 (H) 0 - 1800 PG/ML   CARDIAC PANEL,(CK, CKMB & TROPONIN)    Collection Time: 04/02/17  3:40 AM   Result Value Ref Range    CK 50 39 - 308 U/L    CK - MB <1.0 <3.6 ng/ml    CK-MB Index Cannot be calulated 0.0 - 4.0 %    Troponin-I, Qt. 0.06 0.00 - 0.06 NG/ML   PROTHROMBIN TIME + INR    Collection Time: 04/02/17  3:40 AM   Result Value Ref Range    Prothrombin time 25.1 (H) 11.5 - 15.2 sec    INR 2.4 (H) 0.8 - 1.2     PTT    Collection Time: 04/02/17  3:40 AM   Result Value Ref Range    aPTT 51.0 (H) 23.0 - 36.4 SEC   DIGOXIN    Collection Time: 04/02/17  3:40 AM   Result Value Ref Range    DIGOXIN 1.0 0.9 - 2.0 NG/ML   PHENYTOIN    Collection Time: 04/02/17  3:40 AM   Result Value Ref Range    Phenytoin 8.3 (L) 10.0 - 20 ug/mL   POC G3    Collection Time: 04/02/17  6:57 AM   Result Value Ref Range    Device: NASAL CANNULA      Flow rate (POC) 3 L/M    FIO2 (POC) 0.32 %    pH (POC) 7.403 7.35 - 7.45      pCO2 (POC) 49.3 (H) 35.0 - 45.0 MMHG    pO2 (POC) 113 (H) 80 - 100 MMHG    HCO3 (POC) 30.7 (H) 22 - 26 MMOL/L    sO2 (POC) 98 (H) 92 - 97 %    Base excess (POC) 6 mmol/L    Allens test (POC) N/A      Total resp. rate 25      Site RIGHT RADIAL      Patient temp.  37.0      Specimen type (POC) ARTERIAL      Performed by Orion Alejandre        Procedures/imaging: see electronic medical records for all procedures/Xrays and details which were not copied into this note but were reviewed prior to creation of Plan        CC: Brandon Gutierrez MD

## 2017-04-02 NOTE — ED NOTES
Pt resting in position of comfort. Spouse at bedside. Updated on wait time. No further needs expressed at this time.

## 2017-04-03 PROBLEM — I50.21 SYSTOLIC CHF, ACUTE (HCC): Status: ACTIVE | Noted: 2017-01-01

## 2017-04-03 NOTE — PROGRESS NOTES
INITIAL NUTRITION ASSESSMENT     RECOMMENDATIONS/PLAN:   - Advance diet per SLP recommendation with 'regular' nutrient composition  - Add ONS TID such as Ensure Enlive due to severe weight loss PTA (-17% body weight x3 months)    REASON FOR ASSESSMENT:   [x]  RN Referral:    [x] MST score >/=2  Malnutrition Screening Tool (MST):  Recently Lost Weight Without Trying: Yes  If Yes, How Much Weight Loss: 24 - 33 lbs  Eating Poorly Due to Decreased Appetite: No  MST Score: 3     NUTRITION ASSESSMENT:   Client History: 80 yrs old Male admitted with acute hypoxic respiratory failure due to CHF exacerbation. Pt also with chronic thrombocytopenia, epilepsy, and Afib. H/o dysphagia since CVA in 2007, appears pt was ordered Cardiac Regular diet 4/02 at 1245, downgraded to NDD2 [facility] Altered at 1645, then RN bedside swallow evaluation completed and failed at ~1700. Pt was made NPO at that time. PMHx: CVA, dysphagia, Afib, CAD, CHF, HTN, seizure   Cultural/Anabaptist Food Preferences: None Identified    FOOD/NUTRITION HISTORY  Diet History: no appetite changes PTA, +dysphagia   Food Allergies:  [x] NKFA     [] Yes    Pertinent PTA Medications: vit D3, coumadin, lasix     NUTRITION INTAKE   Diet Order:  NPO      Average PO Intake: No data found. Pertinent Medications:  [x] Reviewed; lasix, aldactone, lipitor, dilantin, coumadin  Insulin:  [] SSI  [] Pre-meal   []  Basal   [] Drip  [x] None  Pt expected to meet estimated nutrient needs through next review:          []  Yes     [x] No; no source of nutrition at this time    BIOCHEMICAL DATA & MEDICAL TESTS  Pertinent Labs:  [x] Reviewed; HbA1c 6 in 12/2016. BUN 26.   ANTHROPOMETRICS  Height: 5' 9\" (175.3 cm)       Weight: 60.1 kg (132 lb 7.9 oz)    BMI: 19.6 kg/m^2  -  normal weight (18.5%-24.9% BMI)        Weight change: Per chart review loss of ~27# (17%) x 3 months is severe weight loss                                  Comparison to Reference Standards:  IBW: 160 lbs %IBW: 83%      AdjBW: N/A    NUTRITION-FOCUSED PHYSICAL ASSESSMENT  Skin: intact, redness to buttocks. GI: last BM 4/01, no N/V    NUTRITION PRESCRIPTION  Calories: 9222-6463 kcal/day based on 35-40 kcal/kg  Protein: 72-90 g/day based on 1.2-1.5 g/kg  CHO: 263 g/day based on 50% of total energy  Fluid: 0422-5633 ml/day based on 1 kcal/ml      NUTRITION DIAGNOSES:   1. Increased energy needs related to SOB as evidenced by -17% body weight x3 months      NUTRITION INTERVENTIONS:   INTERVENTIONS:        GOALS:  1. Advance diet per SLP recommendation, add Enlive TID 1. Diet initiated, maintain body weight, prevent skin breakdown by next review 1-3 days     LEARNING NEEDS (Diet, Supplementation, Food/Nutrient-Drug Interaction):   [x] None Identified  [] Inpatient education provided/documented    [] Identified and patient:  [] Declined     [] Was not appropriate/indicated  NUTRITION MONITORING /EVALUATION:   Follow PO intake  Monitor wt  Monitor renal labs, electrolytes, fluid status  Monitor for additional supplement needs    [] Participated in Interdisciplinary Rounds  [x] [address] [place] [address] Reviewed/Documented  DISCHARGE NUTRITION RECOMMENDATIONS ADDRESSED:     [x] To be determined closer to discharge    NUTRITION RISK:     [x]  At risk                     []  Not currently at risk     Will follow-up per policy.   Damien Kaufman RD  PAGER:  [phone]

## 2017-04-03 NOTE — PROGRESS NOTES
Clinical evaluation of swallow completed, note to follow. Recommend [facility], Avonh soft ground and honey-thick liquid diet, meds in applesauce. ST to f/u.     Thank you for this referral,  Deniz Shelby, SLP

## 2017-04-03 NOTE — CDMP QUERY
Based on the current documentation in the patient record, the diagnosis of Acute Respiratory Failure may not be clinically supported. Please provide documentation to support rule out this diagnosis. A diagnosis of Acute Hypoxic Respiratory Failure due to CHF exacerbation is documented in 4/2 H&P. The record reflects:  81 y/o with h/o CHF. On admission, resp 22, O2 sat 88% on room air, 92-95% on 3l nasal cannula. ABGs on 32% FiO2: pH 7.403, pCO2 49.3, pO2 113. Not on home O2. \"Lungs wet sounding with simple, normal respiratory effort\" per ED. Clinical indicators for Acute Respiratory Failure include:    Respirations >28, air hunger, use of accessory muscles of respiration, inability to speak in full sentences, cyanosis, pulse ox <90% RA or <95% on O2, - pH <7.35 or >7.45 ,  pO2 < 60 mm Hg (or 10mm below COPD patient's baseline) and a pCO2 >50mm Hg (or 10mm above COPD patient's baseline)     Rec'd lasix 80 mg IV in ED. Duo-nebs x2 in ED. Please clarify and document your clinical opinion in the progress notes and discharge summary including the definitive and/or presumptive diagnosis, (suspected or probable), related to the above clinical findings. Please include clinical findings supporting your diagnosis. If you DECLINE this query or would like to communicate with [facility], please utilize the \"Cell[employer]an message box\" at the TOP of the Progress Note on the right.       Thank you,    Tamar Lofton [phone]  Romel Richardson  [address]  [phone]

## 2017-04-03 NOTE — PROGRESS NOTES
0730 Assumed pt care, pt is alert and oriented x4, VSS, afib with a BBB on the monitor, lungs are coarse on 2L nc. Pt denies pain/SOB. Pt is resting quietly in bed with wife at bedside. Call bell has been left within reach. Pt currently NPO and tolerating meds with applesauce. 2015 [facility] Paged Dr. Arlinda Lombard in regards to BP of 81/45 mmhg. 900 [facility] to Dr. Arlinda Lombard in regards to pt's low bp, MD stated he will order a 500ml NS bolus to infuse over two hours. 1245 NS bolus completed, pt's BP rechecked and wnl 120/63 mmhg. Shift Summary- Pt experienced an uneventful shift. Bedside and Verbal shift change report given to CLOVIS Ariza (oncoming nurse) by Adam Rouse   (offgoing nurse). Report included the following information SBAR, Kardex, ED Summary, Procedure Summary, Intake/Output, MAR, Recent Results and Cardiac Rhythm afib.

## 2017-04-03 NOTE — PROGRESS NOTES
Speech Therapy Screening:  Services are indicated and therapy order is requested. An InBasket screening referral was triggered for speech therapy based on results obtained during the nursing admission assessment. The patients chart was reviewed and the patient is appropriate for a skilled therapy evaluation. Please order a consult for speech therapy if you are in agreement and would like an evaluation to be completed. Thank you.     Ariella López, SLP

## 2017-04-03 NOTE — CONSULTS
[address] [place] [address] Rd    Name:  Belem Díaz  MR#:  [id number]  :  1933  Account #:  [de-identified]  Date of Adm:  2017  Date of Consultation:  2017      REASON FOR CONSULTATION: AFib and CHF. HISTORY OF PRESENT ILLNESS: The patient is an 61-year-old with  multiple comorbidities, came to the hospital with worsening of  shortness of breath and also has a history of pneumonia. His past  medical history is significant for cardiomyopathy, CAD, CABG, chronic  atrial fibrillation, status post aortic aneurysm repair, history of atrial  fibrillation, chronic kidney disease with a history of stroke on Coumadin  and seizure disorder, hypertension, dyslipidemia and asthma and with  a history of significant pulmonary fibrosis as well. The patient admitted  with CHF exacerbation. On arrival to the hospital, the patient was  having significant shortness of breath and his pulse was 90/minute,  blood pressure was 105/46, respiration rate was 22. Pulse oximetry is  88% on room air and improved to 95% with 3 liters nasal cannula. The  patient had mild leukocytosis and mild thrombocytopenia as well. His  INR was therapeutic. Denied at this point, fever, cough or pleuritic chest  pain. Denied any trauma to the chest as well. PAST MEDICAL HISTORY: Significant for cardiomyopathy, atrial  fibrillation, CAD, CABG, congestive heart failure, hyperlipidemia,  hypertension, chronic kidney disease, seizure disorder, stroke, history  of pneumonia. SURGICAL HISTORY: Significant and reviewed, discussed with  coronary artery bypass surgery, history of kidney stone surgery. SOCIAL HISTORY: Denied any history of smoking, alcohol or drug  abuse recently. MEDICATIONS: Reviewed and discussed with the patient. REVIEW OF SYSTEMS: A 10-point review of systems completed. Positive pertinent findings discussed in the history of present illness.   The rest of the systems are negative. PHYSICAL EXAMINATION  GENERAL: At the time of consultation, the patient is comfortable, not  in any distress, not using any accessory muscles of respiration. VITAL SIGNS: Temperature is 97.6, heart rate is 82 in atrial fibrillation,  respiration rate is 16, blood pressure is 103/67, pulse oximetry is 97%  with 2 liters nasal cannula. HEENT: Head is atraumatic, normocephalic. NECK: Supple. No JVD, no carotid bruits. HEART: S1, S2 audible. S1 is variable. LUNGS: Bilateral air entry positive, no added sound. ABDOMEN: Soft, nontender. Bowel sounds audible. EXTREMITIES: No edema. Pulses are palpable. NEUROLOGIC: No focal motor or sensory deficit. DERMATOLOGIC: No skin rash. MUSCULOSKELETAL: No obvious joint deformity. LABORATORY DATA: INR is 2.5. Hemoglobin is 12.0, platelet count is  144. Sodium 137, potassium 3.6, creatinine is 1.32. Troponins are  0.06. BNP is 4374. Chest x-ray is consistent with stable CHF findings. ASSESSMENT  1. Acute on chronic congestive heart failure. 2. Chronic atrial fibrillation, now the heart rate is stable. 3. Coronary artery disease. 4. Coronary artery bypass grafting. 5. Hypertension. 6. Dyslipidemia. 7. Cardiomyopathy. PLAN: The echocardiogram has shown stable ejection fraction at 50%  to 55% with significant biatrial enlargement and borderline right  ventricular function. The patient's atrial fibrillation is well controlled. The  patient's symptoms have improved significantly. I will continue with  current medical treatment including carvedilol, atorvastatin 40 mg  daily, digoxin, furosemide, isosorbide 30 mg daily, spironolactone and  valsartan 160 mg daily along with warfarin. The patient's heart rate is  well controlled. If patient continues to have uncontrolled atrial  fibrillation, we will consider adding Cardizem along with current  medical regimen. I have discussed the treatment plan with the patient  and with the wife.  At this point, there is no further cardiac  recommendation. I will sign off. Please feel free to contact me if there  are any questions or concerns.         MD Martha Morales / Elvin Chawla  D:  04/03/2017   14:58  T:  04/03/2017   15:40  Job #:  [id number]

## 2017-04-03 NOTE — PROGRESS NOTES
1940:  Bedside and Verbal shift change report received from Michael Hines RN (offgoing nurse) to Ely Valladares RN (oncoming nurse). Report included the following information SBAR, Kardex, Intake/Output, MAR, Recent Results and Cardiac Rhythm Afib. Pt resting in bed. Appears to be in no distress at this time. Call bell within reach. Zone phone number given to pt. Pt instructed to use call bell if needed. 2225:  Pt tolerated PO meds with no s/s of coughing, choking or distress. Shift summary:  Pt had uneventful shift.

## 2017-04-03 NOTE — PROGRESS NOTES
Hospitalist Progress Note    Patient: Navid Meraz MRN: [id number]  CSN: [id number]    YOB: 1933  Age: 80 y.o. Sex: male    DOA: 4/2/2017 LOS:  LOS: 1 day          Chief Complaint: chf          Assessment/Plan     CHF systolic acute. EF 45%. Atrial fibrillation. CKD. Hx CVA. Seizure disorder. Hypothyroidism. Chronic thrombocytopenia. Dysphagia. Feels better. Breathing improved. Wife present. Continue furosemide, coreg, lipitor. Digoxin, isordil, valsartan and spironolactone. Continue anticoagulation with Warfarin. Pharmacy consulted and following. SLP to eval swallowing given hx of CVA. Continue seizure meds. DVT px via warfarin. Dispo +/- 24 hrs. Patient Active Problem List   Diagnosis Code    Chronic systolic heart failure (HCC) I50.22    A-fib (HCC) I48.91    Type II or unspecified type diabetes mellitus without mention of complication, not stated as uncontrolled E11.9    History of CVA (cerebrovascular accident) Z80.78    CKD (chronic kidney disease) N18.9    Seizure disorder ([facility] [address] G40.909    Dysphagia R13.10    CHF (congestive heart failure) ([facility]) I50.9    Acute respiratory failure with hypercapnia ([facility]) J96.02    Aspiration pneumonia ([facility] [address] J69.0    Pulmonary fibrosis ([facility] [address] J84.10    Acute pulmonary edema ([facility]) J81.0       Subjective:    Breathing feels better. No chest pain.        Review of systems:    Constitutional: denies fevers, chills, myalgias  Respiratory: denies SOB, cough  Cardiovascular: denies chest pain, palpitations  Gastrointestinal: denies nausea, vomiting, diarrhea      Vital signs/Intake and Output:  Visit Vitals    /74    Pulse 71    Temp 97.7 °F (36.5 °C)    Resp 16    Ht 5' 9\" (1.753 m)    Wt 60.1 kg (132 lb 7.9 oz)    SpO2 98%    BMI 19.57 kg/m2     Current Shift:     Last three shifts:  04/01 1901 - 04/03 0700  In: 240 [P.O.:240]  Out: -     Exam:    General: nad  Head/Neck: NCAT, supple, No masses, No lymphadenopathy  CVS:Regular rate and rhythm, no M/R/G, S1/S2 heard, no thrill  Lungs:fine rales in the bases. Abdomen: Soft,  Extremities: No edema. Labs: Results:       Chemistry Recent Labs      04/03/17 0100 04/02/17   0340   GLU  106*  129*   NA  137  138   K  3.6  3.7   CL  102  101   CO2  31  31   BUN  26*  20*   CREA  1.32*  1.34*   CA  8.1*  8.3*   AGAP  4  6   BUCR  20  15   AP   --   71   TP   --   7.1   ALB   --   2.8*   GLOB   --   4.3*   AGRAT   --   0.7*      CBC w/Diff Recent Labs      04/02/17 0340   WBC  14.3*   RBC  3.84*   HGB  12.0*   HCT  36.1   PLT  101*   GRANS  53   LYMPH  26   EOS  1      Cardiac Enzymes Recent Labs      04/03/17   0644  04/03/17 0100   CPK  81  78   CKND1  Cannot be calulated  Cannot be calulated      Coagulation Recent Labs      04/03/17   0100  04/02/17   0340   PTP  25.8*  25.1*   INR  2.5*  2.4*   APTT   --   51.0*       Lipid Panel Lab Results   Component Value Date/Time    Cholesterol, total 110 09/03/2013 02:01 AM    HDL Cholesterol 42 09/03/2013 02:01 AM    LDL, calculated 56.6 09/03/2013 02:01 AM    VLDL, calculated 11.4 09/03/2013 02:01 AM    Triglyceride 57 09/03/2013 02:01 AM    CHOL/HDL Ratio 2.6 09/03/2013 02:01 AM      BNP No results for input(s): BNPP in the last 72 hours.    Liver Enzymes Recent Labs      04/02/17   0340   TP  7.1   ALB  2.8*   AP  71   SGOT  21      Thyroid Studies Lab Results   Component Value Date/Time    TSH 1.75 01/09/2016 07:00 AM        Procedures/imaging: see electronic medical records for all procedures/Xrays and details which were not copied into this note but were reviewed prior to creation of Tae Bloom MD

## 2017-04-03 NOTE — ROUTINE PROCESS
Bedside and Verbal shift change report given to Mey Bhatti RN (oncoming nurse) by Leyla Sim RN (offgoing nurse). Report included the following information SBAR, Kardex, Intake/Output, MAR, Recent Results, Med Rec Status and Cardiac Rhythm Afib BBB.

## 2017-04-03 NOTE — PROGRESS NOTES
Problem: Dysphagia (Adult)  Goal: *Acute Goals and Plan of Care (Insert Text)  Dysphagia Present: moderate  Aspiration: at risk     Recommendations:  Diet: mech soft ground, honey-thick liquids  Meds: in aplesauce  Aspiration Precautions  Other: small sips/bites, slow rate, alternate solids/liquids    Goals: Patient will:  1. Tolerate PO trials with 0 s/s overt distress in 4/5 trials  2. Utilize compensatory swallow strategies/maneuvers (decrease bite/sip, size/rate, alt. liq/sol) with min cues in 4/5 trials  Outcome: Progressing Towards Goal  SPEECH LANGUAGE PATHOLOGY BEDSIDE SWALLOW EVALUATION     Patient: Angely Joyner (72 y.o. male)  Date: 4/3/2017  Primary Diagnosis: Acute pulmonary edema (HCC)        Precautions: aspiration         ASSESSMENT :  Based on the objective data described below, the patient presents with moderate oropharyngeal dysphagia. Clinical evaluation of swallow completed with pt A&Ox2. Known to ST service, completed MBS 1/2017 with recommendation for mech soft ground, honey-thick liquids 2/2 aspiration of thinner consistencies. 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 Spouse reports not consistently utilizing honey-thick liquids in home, received home health ST services with completion of Vital Stim however no repeat MBS results available to verify safe diet advancement. Note mild wet vocal quality baseline, remained with trials of puree and mech soft solids as well as honey-thick liquids via spoon and cup. Increased wet vocal quality and weak cough post thin and nectar-thick consistencies. Educated pt and spouse at length re: dysphagia and aspiration risk, diet recommendations, consideration of active diet modification vs comfort feeding options, [employer] Protocol for quality of life and hydration, and ST POC; understanding voiced.  Recommend mech soft ground and honey-thick liquid diet with ST to f/u 1-2 visits for diet tolerance and ongoing education. Patient will benefit from skilled intervention to address the above impairments. Patients rehabilitation potential is considered to be Good  Factors which may influence rehabilitation potential include:   [ ]            None noted  [X]            Mental ability/status  [X]            Medical condition  [ ]            Home/family situation and support systems  [ ]            Safety awareness  [ ]            Pain tolerance/management  [ ]            Other:        PLAN : mech soft ground and honey-thick liquids  Recommendations and Planned Interventions:  ST to f/u 1-2 visits for diet tolerance, education  Frequency/Duration: Patient will be followed by speech-language pathology 1-2 times per day/4-7 days per week to address goals. Discharge Recommendations: Home Health       SUBJECTIVE:   Patient stated OK.       OBJECTIVE:       Past Medical History:   Diagnosis Date    Atrial fibrillation ([facility] [address]      CAD (coronary artery disease)      Heart failure (HCC)      High cholesterol      Hypertension      Kidney stone      Seizure ([facility] [address]      Stroke [facility])       Past Surgical History:   Procedure Laterality Date    CARDIAC SURG PROCEDURE UNLIST         aneurysm repair    HX UROLOGICAL         kidney stone removed     Prior Level of Function/Home Situation: per chart/pt  Home Situation  Home Environment: Trailer/mobile home  # Steps to Enter: 4  One/Two Story Residence: One story  Living Alone: No  Support Systems: Friends \ neighbors, Family member(s)  Patient Expects to be Discharged to[de-identified] Trailer/mobile home  Current DME Used/Available at Home: Grab bars, Safety frame toliet, Walker  Diet prior to admission: mech soft/honey-thick liquids recommended (consuming thin liquids in home)  Current Diet:  NPO   Cognitive and Communication Status:  Neurologic State: Alert  Orientation Level: Oriented to person, Oriented to place, Disoriented to situation, Disoriented to time  Cognition: Follows commands, Appropriate for age attention/concentration  Perception: Cues to attend to right side of body  Perseveration: No perseveration noted  Safety/Judgement: Awareness of environment, Good awareness of safety precautions, Insight into deficits  Oral Assessment:  Oral Assessment  Labial: Decreased rate;Right droop  Dentition: Intact; Natural  Oral Hygiene: good  Lingual: Decreased rate  Velum: No impairment  Mandible: No impairment  P.O. Trials:  Patient Position: [facility] 45  Vocal quality prior to P.O.: No impairment  Consistency Presented: Thin liquid; Nectar thick liquid;Honey thick liquid;Puree;Mechanical soft  How Presented: Self-fed/presented;Cup/sip;Spoon  How Much:  (x1 thin, x1 NTL, x6 HTL, x6 puree, x2 soft solid)  Bolus Acceptance: No impairment  Bolus Formation/Control: Impaired  Type of Impairment: Mastication  Propulsion: Delayed (# of seconds)  Oral Residue: None  Initiation of Swallow: Delayed (# of seconds)  Laryngeal Elevation: Decreased;Weak  Aspiration Signs/Symptoms: Change vocal quality;Weak cough  Pharyngeal Phase Characteristics: Audible swallow; Suspected pharyngeal residue  Effective Modifications:  Alternate liquids/solids;Small sips and bites  Cues for Modifications: Minimal        Oral Phase Severity: Mild-moderate  Pharyngeal Phase Severity : Moderate     GCODESwallowing:   Swallow Current Status CK= 40-59%   Swallow Goal Status CK= 40-59%     The severity rating is based on the following outcomes:  JEN Noms Swallow Level 4              Clinical Judgement     PAIN:  Start of Eval: 0  End of Eval: 0      After treatment:   [ ]            Patient left in no apparent distress sitting up in chair  [X]            Patient left in no apparent distress in bed  [X]            Call bell left within reach  [ ]            Nursing notified  [X]            Family present  [ ]            Caregiver present  [ ]            Bed alarm activated      COMMUNICATION/EDUCATION: [X]            Aspiration precautions; swallow safety; compensatory techniques. [X]            Patient/family have participated as able in goal setting and plan of care. [X]            Patient/family agree to work toward stated goals and plan of care. [ ]            Patient understands intent and goals of therapy; neutral about participation. [ ]            Patient unable to participate in goal setting/plan of care; educ ongoing with interdisciplinary staff  [X]            Posted safety precautions in patient's room.      Thank you for this referral.  Ariella López, SLP  Time Calculation: 25 mins

## 2017-04-03 NOTE — PROGRESS NOTES
Pharmacy Dosing Services: Warfarin    Consult for Warfarin Dosing by Pharmacy by Dr. Dorrie Goodell provided for this 80 y.o.  male , for indication of Atrial Fibrillation. Day of Therapy: continuation from home regimen  Dose to achieve an INR goal of 2-3    Order entered for  Warfarin  4 mg to be given today at 18:00. Significant drug interactions: None    LABS    PT/INR Lab Results   Component Value Date/Time    INR 2.5 04/03/2017 01:00 AM      Platelets Lab Results   Component Value Date/Time    PLATELET 127 65/42/7605 03:40 AM      H/H     Lab Results   Component Value Date/Time    HGB 12.0 04/02/2017 03:40 AM        Warfarin Administrations (last 168 hours)       Date/Time Action Medication Dose      04/02/17 1726 Given    warfarin (COUMADIN) tablet 4 mg 4 mg          Pharmacy to follow daily and will provide subsequent Warfarin dosing based on clinical status. Sushma Silvestre, Pharm. D.   Clinical Pharmacist  [phone]

## 2017-04-03 NOTE — PROGRESS NOTES
conducted an initial consultation and Spiritual Assessment for Angeles Osborn, who is a 80 y.o.,male. Patients Primary Language is: [place]. According to the patients EMR Sikhism Affiliation is: Dimitris Fischer.     The reason the Patient came to the hospital is:   Patient Active Problem List    Diagnosis Date Noted    Acute pulmonary edema ([facility] [address] 04/02/2017    Pulmonary fibrosis ([address] 01/02/2017    CHF (congestive heart failure) ([facility] [address] 12/30/2016    Acute respiratory failure with hypercapnia ([facility] [address] 12/30/2016    Aspiration pneumonia ([address] 12/30/2016    Dysphagia 01/12/2016    Seizure disorder ([facility] [address] 09/02/2013    Type II or unspecified type diabetes mellitus without mention of complication, not stated as uncontrolled 12/04/2012    History of CVA (cerebrovascular accident) 12/04/2012    CKD (chronic kidney disease) 12/04/2012    Chronic systolic heart failure ([facility] [address] 12/03/2012    [facility] 12/03/2012        The  provided the following Interventions:  Initiated a relationship of care and support with patient and family present. Explored issues of scott, belief, spirituality and Scientologist/ritual needs while hospitalized. Listened empathically. Provided chaplaincy education. Provided information about Spiritual Care Services. Offered assurance of prayer on patient's behalf. Chart reviewed. The following outcomes where achieved:  Patient shared limited information about both their medical narrative and spiritual journey/beliefs.  confirmed Patient's Sikhism Affiliation. Patient is member of [employer]. Patient processed feeling about current hospitalization. Patient expressed gratitude for 's visit. Assessment:  Patient and his wife appreciated  visit. They stated they are members of [facility] came to visit yesterday.  Patient does not have any Scientologist/cultural needs that will affect patients preferences in health care. Plan:  Chaplains will continue to follow and will provide pastoral care on an as needed/requested basis.  recommends bedside caregivers page  on duty if patient shows signs of acute spiritual or emotional distress. Rev.  Simon Gonzales  [facility]  [phone]

## 2017-04-04 NOTE — PROGRESS NOTES
1205 Patient's blood pressure was 80/55 manually. Dr Ceferino Taylor was paged and orders were given to discontinue Coreg. Will continue to monitor every hour. 1600 Requested nebulizer treatment for complaints of short of month. 1630 Nebulizer treatment unaffected. Lungs fields coarse and patient short of breath. Dr. Silvia Clifton notified and orders received. See previous nurse note.

## 2017-04-04 NOTE — PROGRESS NOTES
Pharmacy Dosing Services: Warfarin    Consult for Warfarin Dosing by Pharmacy by Dr. Gayla Fernandez provided for this 80 y.o.  male , for indication of Atrial Fibrillation. Day of Therapy: continuation from home regimen  Dose to achieve an INR goal of 2-3    Order entered for  Warfarin  4 mg to be given today at 18:00. Significant drug interactions: None    LABS    PT/INR Lab Results   Component Value Date/Time    INR 2.5 04/04/2017 05:33 AM      Platelets Lab Results   Component Value Date/Time    PLATELET 93 54/47/7124 05:33 AM      H/H     Lab Results   Component Value Date/Time    HGB 12.3 04/04/2017 05:33 AM        Warfarin Administrations (last 168 hours)       Date/Time Action Medication Dose      04/03/17 1743 Given    warfarin (COUMADIN) tablet 4 mg 4 mg    04/02/17 1726 Given    warfarin (COUMADIN) tablet 4 mg 4 mg              Pharmacy to follow daily and will provide subsequent Warfarin dosing based on clinical status. Owen Gee, Pharm. D.   Clinical Pharmacist  [phone]

## 2017-04-04 NOTE — PROGRESS NOTES
[id number] Shift Summary: Pt rested well overnight with no complaints. No new clinical concerns noted.

## 2017-04-04 NOTE — PROGRESS NOTES
Hospitalist Progress Note-critical care note     Patient: Cayden Kay MRN: [id number]  CSN: [id number]    YOB: 1933  Age: 80 y.o. Sex: male    DOA: 4/2/2017 LOS:  LOS: 2 days            Chief complaint:    Assessment/Plan         Patient Active Problem List   Diagnosis Code    Chronic systolic heart failure (HCC) I50.22    A-fib ([facility]) I48.91    Type II or unspecified type diabetes mellitus without mention of complication, not stated as uncontrolled E11.9    History of CVA (cerebrovascular accident) Z80.78    CKD (chronic kidney disease) N18.9    Seizure disorder ([facility] [address] G40.909    Dysphagia R13.10    CHF (congestive heart failure) ([facility]) I50.9    Acute respiratory failure with hypercapnia ([facility]) J96.02    Aspiration pneumonia ([facility] [address] J69.0    Pulmonary fibrosis ([facility] [address] J84.10    Acute pulmonary edema ([facility]) R14.6    Systolic CHF, acute ([facility] [address] I50.21     1. CHF systolic acute. EF 45%. cardiology was on board,   Will decrease lasix and decrease valsatan due to lower borderline bp      2. Atrial fibrillation. On warfarin, rate controlled per coreg,inr daily     3. CKD. Continue monitor    4. Hx CVA. Fall precaution     5. Seizure disorder. Continue home meds     6. Hypothyroidism. Chronic thrombocytopenia. 7. Dysphagia. Dysphagia diet, aspiration precaution      8 aphagia   Due to the stroke   9 debility  10 hypotension  Asymptomatic, will hold hypertension medication,  Decrease lasix to once a day. 11. Pulmonary fibrosis   Continue breathing tx. Need palliative care for code status. Subjective; no chest pain, sob better     Family  was at the bedside   All questions have been answered. 45 total min's spent on patient care including >50% on counseling/coordinating care. Discussed the above assessments. also discussed labs, medications and hospital course    AC: I have had a discussion with wife regarding code status.  Particularly, described potential options in event of cardiac or respiratory arrest. I have explained what being full code entails, including cardiorespiratory resuscitation attempts with chest compressions, potential cariodioversion/ \" shocks\" as well as intubation. I have also explained Do not resuscitate which would mean we allow a natural death with out aggressive interventions. DNR/I granted  AC 32 min         Review of systems:    General: No fevers or chills. Cardiovascular: No chest pain or pressure. No palpitations. Pulmonary: No shortness of breath. Gastrointestinal: No nausea, vomiting. Vital signs/Intake and Output:  Visit Vitals    BP (!) 80/55 (BP 1 Location: Left arm, BP Patient Position: At rest)    Pulse 88    Temp 98.4 °F (36.9 °C)    Resp 18    Ht 5' 9\" (1.753 m)    Wt 59.3 kg (130 lb 11.7 oz)    SpO2 95%    BMI 19.31 kg/m2     Current Shift:     Last three shifts:  04/02 1901 - 04/04 0700  In: 800 [P.O.:800]  Out: -     Physical Exam:  General: WD, WN. Alert, cooperative, no acute distress    HEENT: NC, Atraumatic. PERRLA, anicteric sclerae. Lungs: CTA Bilaterally. No Wheezing/Rhonchi/Rales. Heart:  Regular  rhythm,  + murmur, No Rubs, No Gallops  Abdomen: Soft, Non distended, Non tender.  +Bowel sounds,   Extremities: No c/c/e  Psych:   Not anxious or agitated. Neurologic:  No acute neurological deficit.              Labs: Results:       Chemistry Recent Labs      04/04/17   0533  04/03/17   0100  04/02/17   0340   GLU  90  106*  129*   NA  139  137  138   K  3.6  3.6  3.7   CL  102  102  101   CO2  33*  31  31   BUN  24*  26*  20*   CREA  1.14  1.32*  1.34*   CA  8.4*  8.1*  8.3*   AGAP  4  4  6   BUCR  21*  20  15   AP   --    --   71   TP   --    --   7.1   ALB   --    --   2.8*   GLOB   --    --   4.3*   AGRAT   --    --   0.7*      CBC w/Diff Recent Labs      04/04/17   0533  04/02/17   0340   WBC  13.2  14.3*   RBC  4.00*  3.84*   HGB  12.3*  12.0*   HCT  37.8  36.1   PLT  93*  101*   GRANS   --   53   LYMPH   --   26 EOS   --   1      Cardiac Enzymes Recent Labs      04/03/17   1925  04/03/17   1346   CPK  81  78   CKND1  Cannot be calulated  Cannot be calulated      Coagulation Recent Labs      04/04/17   0533  04/03/17   0100  04/02/17   0340   PTP  25.9*  25.8*  25.1*   INR  2.5*  2.5*  2.4*   APTT   --    --   51.0*       Lipid Panel Lab Results   Component Value Date/Time    Cholesterol, total 110 09/03/2013 02:01 AM    HDL Cholesterol 42 09/03/2013 02:01 AM    LDL, calculated 56.6 09/03/2013 02:01 AM    VLDL, calculated 11.4 09/03/2013 02:01 AM    Triglyceride 57 09/03/2013 02:01 AM    CHOL/HDL Ratio 2.6 09/03/2013 02:01 AM      BNP No results for input(s): BNPP in the last 72 hours.    Liver Enzymes Recent Labs      04/02/17   0340   TP  7.1   ALB  2.8*   AP  71   SGOT  21      Thyroid Studies Lab Results   Component Value Date/Time    TSH 1.75 01/09/2016 07:00 AM        Procedures/imaging: see electronic medical records for all procedures/Xrays and details which were not copied into this note but were reviewed prior to creation of Elisa Mercaod MD

## 2017-04-04 NOTE — PROGRESS NOTES
0720 Received report from CLOVIS Ariza RN. Patient is alert and oriented x4, vital signs are stable, with heart sounds in A-fib. Patient denies any pain and is resting quietly in bed with nebulizer treatment in place. Patient is on a pureed diet with applesauce taken with medications. Call bell has been left in reach.

## 2017-04-04 NOTE — PROGRESS NOTES
Problem: Dysphagia (Adult)  Goal: *Acute Goals and Plan of Care (Insert Text)  Dysphagia Present: moderate  Aspiration: at risk     Recommendations:  Diet: mech soft ground, honey-thick liquids  Meds: in aplesauce  Aspiration Precautions  Other: small sips/bites, slow rate, alternate solids/liquids    Goals: Patient will:  1. Tolerate PO trials with 0 s/s overt distress in 4/5 trials  2. Utilize compensatory swallow strategies/maneuvers (decrease bite/sip, size/rate, alt. liq/sol) with min cues in 4/5 trials     Outcome: Progressing Towards Goal  SPEECH LANGUAGE PATHOLOGY DYSPHAGIA TREATMENT     Patient: Miles Gonzalez (70 y.o. male)  Date: 4/4/2017  Diagnosis: Acute pulmonary edema (HCC) Systolic CHF, acute (HCC)       Precautions: Aspiration        ASSESSMENT:  Moderate oropharyngeal dysphagia. Pt was alert during dysphagia intervention with wife present. Per wife, the pt consumes >50% of his meal with improved appetite. No overt s/sx with meals. Oral care completed; Trained wife on implementation of the [place]. Trialed thin liquids with 1 overt s/sx aspiration/penetration d/t delayed pharyngeal swallow. Weak, unproductive cough noted. Ice chips more successful without cough. Increased fatigue with 5 trials in a row; recommended rest breaks per bolus. ST to f/u 1x to continue [employer] as well as diet tolerance. Progression toward goals:  [X]         Improving appropriately and progressing toward goals  [ ]         Improving slowly and progressing toward goals  [ ]         Not making progress toward goals and plan of care will be adjusted       PLAN: Mechanical Soft with Honey Thickened Liquids; [employer] Protocol   Recommendations and Planned Interventions:  ST to f/u 1x to continue [employer] as well as diet tolerance. Patient continues to benefit from skilled intervention to address the above impairments.  Continue treatment per established plan of care. Discharge Recommendations: To Be Determined       SUBJECTIVE:   Pt nonverbal. Wife stated, \"So, I clean his mouth then give ice chips? \"      OBJECTIVE:   Cognitive and Communication Status:  Neurologic State: Alert  Orientation Level: Unable to verbalize  Cognition: Appropriate for age attention/concentration, Appropriate decision making  Perception: Cues to attend to right side of body  Perseveration: No perseveration noted  Safety/Judgement: Awareness of environment, Good awareness of safety precautions, Insight into deficits  Dysphagia Treatment:  Oral Assessment:  Oral Assessment  Labial: Decreased rate, Right droop  Dentition: Intact, Natural  Oral Hygiene: good  Lingual: Decreased rate  Velum: No impairment  Mandible: No impairment  P.O. Trials:              Patient Position: 39 HOB              Vocal quality prior to P.O.: Low volume, Strain (baseline vq)              Consistency Presented:  Thin liquid, Ice chips              How Presented: Spoon, SLP-fed/presented              How Much:  (x1 thin; x5 ice chips)              Bolus Acceptance: No impairment              Bolus Formation/Control: Impaired              Type of Impairment: Mastication              Propulsion: Delayed (# of seconds)              Oral Residue: None              Initiation of Swallow: Delayed (# of seconds)              Laryngeal Elevation: Functional, Decreased              Aspiration Signs/Symptoms: Weak cough, Change vocal quality              Pharyngeal Phase Characteristics: Audible swallow, Suspected pharyngeal residue              Effective Modifications: Small sips and bites, Spoon (for madrigal free water protocol)              Cues for Modifications:  (set up by SLP and caregiver)                                Oral Phase Severity: Mild-moderate              Pharyngeal Phase Severity : Moderate              PAIN:  Start of Tx: 0  End of Tx: 0      After treatment:   [ ]              Patient left in no apparent distress sitting up in chair  [X]              Patient left in no apparent distress in bed  [ ]              Call bell left within reach  [ ]              Nursing notified  [X]              Family present  [ ]              Caregiver present  [ ]              Bed alarm activated         COMMUNICATION/EDUCATION   [X]        Aspiration precautions; swallow safety; compensatory techniques  [ ]        Patient unable to participate in education; education ongoing with staff  [ ]         Posted safety precautions in patient's room.   [ ]         Oral-motor/laryngeal strengthening exercises        JAYY Carmona  Time Calculation: 18 mins

## 2017-04-04 NOTE — ROUTINE PROCESS
Bedside and Verbal shift change report given to SARMAD Lora RN (oncoming nurse) by SUNNY Bill (offgoing nurse). Report included the following information SBAR, Kardex, MAR, Recent Results and Cardiac Rhythm A-fib.

## 2017-04-04 NOTE — PROGRESS NOTES
1200 Dr Michael Suazo paged regarding asymptomatic low blood pressure 80/55 after receiving Coreg, lasix, isosorbide, and aldactone this morning. Ordered to discontinue isosorbide, continue to monitor blood pressure, no fluids at this time. Will continue to monitor.

## 2017-04-04 NOTE — PROGRESS NOTES
1629 Telephone orders received for Lasix 20 mg one time dose for difficulty breathing after neb tx ineffective. Hold evening Coreg 3.125 mg one time for low blood pressure today. 1940 Patient resting with eyes closed, no sign of respiratory distress, endorsed to oncoming nurse to continue to monitor.

## 2017-04-04 NOTE — ROUTINE PROCESS
Bedside and Verbal shift change report given to Alexia Warner (oncoming nurses) by Nyoka Cogan (offgoing nurse). Report given with SBAR, Kardex, Intake/Output, MAR and Recent Results.

## 2017-04-05 PROBLEM — K59.00 CONSTIPATION: Status: ACTIVE | Noted: 2017-01-01

## 2017-04-05 NOTE — PROGRESS NOTES
730 Assumed patient care from off going nurse SARMAD Joreg. Patient is resting in bed with signs of no distress. Call bell and phone are within reach. 1200 Interdisciplinary team rounds were held 4/5/2017 with the following team members:Care Management, Pharmacy, Physician and Clinical Coordinator and the patient and spouse. Plan of care discussed. See clinical pathway and/or care plan for interventions and desired outcomes. 1400 Patient up and out of bed ambulating with physical therapist. Patient back to bed with spouse at the bedside. 32 61 16 Patient experiencing respiratory distress, slightly coarse breath sounds heard through lung fields, very coarse in the upper airway, weak congested cough noted. When patient was asked if he was ok, patient nodded head no. Spoke with dr. Johnnie Bhat and made recommendations for patient to be NPO, speech to evaluate (high risk for aspiration) , portable chest xray, and a one time dose of Lasix 20mg IV push. Dr. Johnnie Bhat agreed. Will administer medication and continue to monitor. 1615 Fleets enema effective, patient had a large brown formed bowel movement. 1751 Patient expressed breathing has improved since lasix and receiving nebulizer treatment. No signs of distress. Lung sounds clear and O2 Sats running in the low 90's on 2 L nasal canula. Will continue to monitor.

## 2017-04-05 NOTE — CONSULTS
[facility]: [phone] ([phone]  [facility]: [phone]   [facility]: [phone]    Patient Name: Miles Gonzalez  YOB: 1933    Date of Initial Consult: 4/5/17  Reason for Consult: care decisions  Requesting Provider: Dr. Becky Sen   Primary Care Physician: Aydin Salvador MD      SUMMARY:   Miles Gonzalez is a 80y.o. 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 Walking w/ cane and feeding self at home, though wife says he never completely got over last hospitalization. Prior to then had had about four hospitalizations for aspiration since major stroke in 2008. No other hospitalization in last year. Has lost significant wt in last six months. PALLIATIVE DIAGNOSES:     Patient Active Problem List   Diagnosis Code    Chronic systolic heart failure (HCC) I50.22    A-fib ([facility]) I48.91    Type II or unspecified type diabetes mellitus without mention of complication, not stated as uncontrolled E11.9    History of CVA (cerebrovascular accident) Z80.78    CKD (chronic kidney disease) N18.9    Seizure disorder ([address] G40.909    Dysphagia R13.10    CHF (congestive heart failure) ([facility]) I50.9    Acute respiratory failure with hypercapnia ([facility]) J96.02    Aspiration pneumonia ([address] J69.0    Pulmonary fibrosis ([address] J84.10    Acute pulmonary edema ([facility]) Q79.1    Systolic CHF, acute ([address] I50.21   1.   2. PLAN:   1. Met with patient and his wife. He was largely non verbal. Reviewed clinical course and possibility that rapid readmission for aspiration may be a marker for worsening aspiration and general decline. Has AMD and DDNR on chart. She is not certain if he will be willing to discharge to SNF rather than home with PT if it is recommended. May derive only marginal benefit.  Should he go home and certainly in the longer run she will need additional personal home care. Admission to SNF carries risk of hospital readmission. He denies current sxs. Questions answered. 2. Initial consult note routed to primary continuity provider  3. Communicated plan of care with: Palliative IDT       GOALS OF CARE:   Curative w/ limitations    Advance Care Planning 4/2/2017   Patient's Healthcare Decision Maker is: Legal Next of Osiel Mensah   Primary Decision Maker Name Demetrius Moyer   Primary Decision Maker Phone Number [phone]   Primary Decision Maker Relationship to Patient Spouse   Confirm Advance Directive Yes, not on file   Does the patient have other document types Power of            HISTORY:     History obtained from: patient and wife    CHIEF COMPLAINT: see summary    HPI/SUBJECTIVE:    The patient is:   [] Verbal and participatory  [x] Non-participatory due to:   Minimal verbal response     Clinical Pain Assessment (nonverbal scale for nonverbal patients):    denies       FUNCTIONAL ASSESSMENT:     Palliative Performance Scale (PPS): 40          PSYCHOSOCIAL/SPIRITUAL SCREENING:     Advance Care Planning:  Advance Care Planning 4/2/2017   Patient's Healthcare Decision Maker is: Legal Next of Osiel Mensah   Primary Decision Maker Name Demetrius Moyer   Primary [address] Phone Number [phone]   Primary Decision Maker Relationship to Patient Spouse   Confirm Advance Directive Yes, not on file   Does the patient have other document types Power of         Any spiritual / Taoist concerns:  [] Yes /  [x] No    Caregiver Burnout:  [] Yes /  [x] No /  [] No Caregiver Present      Anticipatory grief assessment:   [] Normal  / [] Maladaptive       ESAS Anxiety:      ESAS Depression:          REVIEW OF SYSTEMS:     Positive and pertinent negative findings in ROS are noted above in HPI. The following systems were [] reviewed / [x] unable to be reviewed as noted in HPI  Other findings are noted below.   Systems: constitutional, ears/nose/mouth/throat, respiratory, gastrointestinal, genitourinary, musculoskeletal, integumentary, neurologic, psychiatric, endocrine. Positive findings noted below. Modified ESAS Completed by: provider                                   Stool Occurrence(s): 0        PHYSICAL EXAM:     Wt Readings from Last 3 Encounters:   04/05/17 60.5 kg (133 lb 6.1 oz)   01/03/17 65.2 kg (143 lb 12.8 oz)   01/12/16 69.9 kg (154 lb)     Blood pressure 107/63, pulse 65, temperature 97.6 °F (36.4 °C), resp. rate 15, height 5' 9\" (1.753 m), weight 60.5 kg (133 lb 6.1 oz), SpO2 97 %. Pain:  Pain Scale 1: Numeric (0 - 10)  Pain Intensity 1: 0                 Last bowel movement: yesterday    Constitutional: frail, NAD  Eyes: pupils equal, anicteric  ENMT: no nasal discharge, moist mucous membranes  Cardiovascular: regular rhythm, distal pulses intact  Respiratory: breathing not labored, symmetric  Gastrointestinal: soft non-tender, +bowel sounds  Musculoskeletal: no deformity, no tenderness to palpation  Skin: warm, dry  Neurologic: following commands, moving all extremities. R hemiparesis\  Psychiatric: full affect, no hallucinations  Other:       HISTORY:     Principal Problem:    Systolic CHF, acute ([address] (4/3/2017)    Active Problems:    A-fib ([address] (12/3/2012)      History of CVA (cerebrovascular accident) (12/4/2012)      CKD (chronic kidney disease) (12/4/2012)      Seizure disorder ([address] (9/2/2013)      Dysphagia (1/12/2016)      Acute pulmonary edema ([address] (4/2/2017)      Past Medical History:   Diagnosis Date    Atrial fibrillation ([address]     CAD (coronary artery disease)     Heart failure (HCC)     High cholesterol     Hypertension     Kidney stone     Seizure ([address]     Stroke [facility])       Past Surgical History:   Procedure Laterality Date    CARDIAC SURG PROCEDURE UNLIST      aneurysm repair    HX UROLOGICAL      kidney stone removed      History reviewed. No pertinent family history.   History reviewed, no pertinent family history.   Social History   Substance Use Topics    Smoking status: Former Smoker    Smokeless tobacco: Not on file    Alcohol use No     No Known Allergies   Current Facility-Administered Medications   Medication Dose Route Frequency    sodium phosphate (FLEET'S) enema 118 mL  1 Enema Rectal NOW    polyethylene glycol (MIRALAX) packet 17 g  17 g Oral BID    docusate sodium (COLACE) capsule 100 mg  100 mg Oral DAILY    furosemide (LASIX) injection 40 mg  40 mg IntraVENous DAILY    valsartan (DIOVAN) tablet 20 mg  20 mg Oral DAILY    budesonide (PULMICORT) 500 mcg/2 ml nebulizer suspension  500 mcg Nebulization BID RT    albuterol-ipratropium (DUO-NEB) 2.5 MG-0.5 MG/3 ML  3 mL Nebulization Q4H PRN    atorvastatin (LIPITOR) tablet 40 mg  40 mg Oral DAILY    carvedilol (COREG) tablet 3.125 mg  3.125 mg Oral BID WITH MEALS    digoxin (LANOXIN) tablet 0.125 mg  0.125 mg Oral EVERY TUES,THUR,SAT,SUN    digoxin (LANOXIN) tablet 0.25 mg  0.25 mg Oral Q MON, WED & FRI    guaiFENesin ER (MUCINEX) tablet 600 mg  600 mg Oral Q12H    levothyroxine (SYNTHROID) tablet 50 mcg  50 mcg Oral ACB    spironolactone (ALDACTONE) tablet 25 mg  25 mg Oral DAILY    acetaminophen (TYLENOL) tablet 650 mg  650 mg Oral Q4H PRN    ondansetron (ZOFRAN) injection 4 mg  4 mg IntraVENous Q4H PRN    Warfarin - Pharmacy to Dose  1 Each Other Rx Dosing/Monitoring    phenytoin ER (DILANTIN ER) ER capsule 300 mg  300 mg Oral QHS        LAB AND IMAGING FINDINGS:     Lab Results   Component Value Date/Time    WBC 13.2 04/04/2017 05:33 AM    HGB 12.3 04/04/2017 05:33 AM    PLATELET 93 25/58/5013 05:33 AM     Lab Results   Component Value Date/Time    Sodium 145 04/05/2017 05:20 AM    Potassium 3.8 04/05/2017 05:20 AM    Chloride 104 04/05/2017 05:20 AM    CO2 34 04/05/2017 05:20 AM    BUN 24 04/05/2017 05:20 AM    Creatinine 1.24 04/05/2017 05:20 AM    Calcium 8.5 04/05/2017 05:20 AM    Magnesium 1.9 04/05/2017 05:20 AM Phosphorus 2.8 12/03/2009 03:28 AM      Lab Results   Component Value Date/Time    AST (SGOT) 21 04/02/2017 03:40 AM    Alk.  phosphatase 71 04/02/2017 03:40 AM    Protein, total 7.1 04/02/2017 03:40 AM    Albumin 2.8 04/02/2017 03:40 AM    Globulin 4.3 04/02/2017 03:40 AM     Lab Results   Component Value Date/Time    INR 2.4 04/05/2017 05:20 AM    Prothrombin time 24.8 04/05/2017 05:20 AM    aPTT 51.0 04/02/2017 03:40 AM      No results found for: IRON, FE, TIBC, IBCT, PSAT, FERR   No results found for: PH, PCO2, PO2  No components found for: [place]   Lab Results   Component Value Date/Time    CK 81 04/03/2017 07:25 PM    CK - MB <1.0 04/03/2017 07:25 PM              Total time: 50 min  Counseling / coordination time: 46  > 50% counseling / coordination      Juan Salcedo MD  Palliative Medicine

## 2017-04-05 NOTE — PROGRESS NOTES
Borderline heart size with extensive pleural and parenchymal densities nearly  all of which are likely chronic. However, there has been a mild progressive  increase in the opacities in the right apex either due to worsening fibrosis or  developing infiltrates within the background of scarring. 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     Will start levaquin for aspiration pna

## 2017-04-05 NOTE — PROGRESS NOTES
Problem: Mobility Impaired (Adult and Pediatric)  Goal: *Acute Goals and Plan of Care (Insert Text)  Physical Therapy Goals  Initiated 4/5/2017 and to be accomplished within 2-8 day(s)  1. Patient will move from supine to sit and sit to supine in bed with supervision/CGA. 2. Patient will transfer from bed to chair and chair to bed with supervision/CGA using the least restrictive device. 3. Patient will perform sit to stand with supervision/CGA. 4. Patient will ambulate with supervision/CGA for 150 feet with the least restrictive device. 5. Patient will ascend/descend 4 stairs with use of handrail(s) with minimal assistance/contact guard assist.  Outcome: Progressing Towards Goal  PHYSICAL THERAPY EVALUATION     Patient: Chayo Grimes (2 y.o. male)  Date: 4/5/2017  Primary Diagnosis: Acute pulmonary edema (HCC)        Precautions: Fall         ASSESSMENT :  Based on the objective data described below, the patient presents with decreased functional mobility with regards to bed mobility, transfers, gait, balance, and tolerance to activity. The patient has a history of multiple old strokes, however is very motivated and willing to participate. The patient sat up to edge of bed with CGA. Noted patient had urinated in bed. Assisted to have the patient's brief changed and patient's spouse assisted with alli-care. Patient then stood up to a rolling walker and proceeded to ambulate into the hallway for a total of about 400 feet. Patient's gait is very slow and fairly steady, however shuffled in appearance. Patient required two standing rest breaks while ambulating. O2 saturations dropped to 85% while on 2L O2. Patient was brought up to 3L while ambulating back to his room and remained around 90%. Patient was returned back to his room with spouse present. Will continue PT to maximize the patient's safe and independent mobility.  Patient's spouse reports that a home discharge is planned, however also reports that rehab is something that she is considering. The patient is fairly mobile and should be safe for a home discharge, however the spouse is unsure if he will continue to be as mobile in the future. Will follow up and progress as tolerated. Patient will benefit from skilled intervention to address the above impairments. Patients rehabilitation potential is considered to be Good  Factors which may influence rehabilitation potential include:   [ ]         None noted  [ ]         Mental ability/status  [X]         Medical condition  [ ]         Home/family situation and support systems  [ ]         Safety awareness  [ ]         Pain tolerance/management  [ ]         Other:        PLAN :  Recommendations and Planned Interventions:  [X]           Bed Mobility Training             [ ]    Neuromuscular Re-Education  [X]           Transfer Training                   [ ]    Orthotic/Prosthetic Training  [X]           Gait Training                          [ ]    Modalities  [X]           Therapeutic Exercises          [ ]    Edema Management/Control  [X]           Therapeutic Activities            [X]    Patient and Family Training/Education  [ ]           Other (comment):     Frequency/Duration: Patient will be followed by physical therapy daily to address goals.   Discharge Recommendations: Rehab vs Home Health  Further Equipment Recommendations for Discharge: N/A       SUBJECTIVE:   Patient stated Yeah.      OBJECTIVE DATA SUMMARY:       Past Medical History:   Diagnosis Date    [facility]      CAD (coronary artery disease)      Heart failure ([facility]      High cholesterol      Hypertension      Kidney stone      Seizure ([facility] [facility]      Stroke [facility])       Past Surgical History:   Procedure Laterality Date    CARDIAC SURG PROCEDURE UNLIST         aneurysm repair    HX UROLOGICAL         kidney stone removed     Barriers to Learning/Limitations: yes;  sensory deficits-vision/hearing/speech  Compensate with: Verbal Cues  Prior Level of Function/Home Situation: Patient lives with spouse in mobile home with 4 steps to enter. Uses a cane to ambulate. Home Situation  Home Environment: Trailer/mobile home  # Steps to Enter: 4  One/Two Story Residence: One story  Living Alone: No  Support Systems: Friends \ neighbors, Family member(s)  Patient Expects to be Discharged to[de-identified] Trailer/mobile home  Current DME Used/Available at Home: Grab bars, Safety frame toliet, Walker  Critical Behavior:  Neurologic State: Alert  Orientation Level: Unable to verbalize  Cognition: Decreased command following; Appropriate for age attention/concentration  Psychosocial  Purposeful Interaction: Yes  Pt Identified Daily Priority: Clinical issues (comment)  Caritas Process: Nurture loving kindness;Enable scott/hope;Establish trust;Teaching/learning;Supportive expression  Caring Interventions: Reassure  Reassure: Support family;Caring rounds  Therapeutic Modalities: Intentional therapeutic touch  Skin Condition/Temp: Warm;Dry;Fragile  Skin Integrity: Excoriation  Skin Integumentary  Skin Color: Appropriate for ethnicity  Skin Condition/Temp: Warm;Dry;Fragile  Skin Integrity: Excoriation  Turgor: Epidermis thin w/ loss of subcut tissue  Hair Growth: Sparce  Varicosities: Absent  Strength:    Strength: Generally decreased, functional  Tone & Sensation:   Tone: Normal  Sensation: Intact  Range Of Motion:  AROM: Generally decreased, functional  PROM: Generally decreased, functional  Functional Mobility:  Bed Mobility:  Rolling: Contact guard assistance  Supine to Sit: Contact guard assistance  Sit to Supine: Minimum assistance  Scooting: Contact guard assistance  Transfers:  Sit to Stand: Contact guard assistance  Stand to Sit: Contact guard assistance  Balance:   Sitting: Intact  Standing: Impaired; With support  Standing - Static: Good  Standing - Dynamic : Fair (Fair+)  Ambulation/Gait Training:  Distance (ft): 400 Feet (ft)  Assistive Device: Gait belt;Walker, rolling  Ambulation - Level of Assistance: Contact guard assistance  Gait Description (WDL): Exceptions to WDL  Gait Abnormalities: Decreased step clearance;Shuffling gait  Base of Support: Narrowed  Stance: Time  Speed/Aurelia: Slow  Step Length: Right shortened;Left shortened  Swing Pattern: Right asymmetrical;Left asymmetrical     Pain:  Pain Scale 1: Numeric (0 - 10)  Pain Intensity 1: 0  Activity Tolerance:   Good  Please refer to the flowsheet for vital signs taken during this treatment. After treatment:   [ ]         Patient left in no apparent distress sitting up in chair  [X]         Patient left in no apparent distress in bed  [X]         Call bell left within reach  [X]         Nursing notified  [ ]         Caregiver present  [ ]         Bed alarm activated      COMMUNICATION/EDUCATION:   [X]         Fall prevention education was provided and the patient/caregiver indicated understanding. [X]         Patient/family have participated as able in goal setting and plan of care. [X]         Patient/family agree to work toward stated goals and plan of care. [ ]         Patient understands intent and goals of therapy, but is neutral about his/her participation. [ ]         Patient is unable to participate in goal setting and plan of care. Thank you for this referral.  Renita Navarro   Time Calculation: 37 mins      Mobility  Current  CJ= 20-39%   Goal  CI= 1-19%. The severity rating is based on the Level of Assistance required for Functional Mobility and ADLs.      Eval Complexity: History: MEDIUM  Complexity : 1-2 comorbidities / personal factors will impact the outcome/ POC Exam:MEDIUM Complexity : 3 Standardized tests and measures addressing body structure, function, activity limitation and / or participation in recreation  Presentation: MEDIUM Complexity : Evolving with changing characteristics  Clinical Decision Making:Medium Complexity Patient's O2 saturations decreased to 85% while ambulating. Has a history of multiple past strokes.  Overall Complexity:MEDIUM

## 2017-04-05 NOTE — PROGRESS NOTES
Problem: Dysphagia (Adult)  Goal: *Acute Goals and Plan of Care (Insert Text)  Dysphagia Present: moderate  Aspiration: at risk     Recommendations:  Diet: puree, honey-thick liquids  Meds: in aplesauce  Aspiration Precautions  Other: small sips/bites, slow rate, alternate solids/liquids    Goals: Patient will:  1. Tolerate PO trials with 0 s/s overt distress in 4/5 trials  2. Utilize compensatory swallow strategies/maneuvers (decrease bite/sip, size/rate, alt. liq/sol) with min cues in 4/5 trials     Outcome: Not Progressing Towards Goal  SPEECH LANGUAGE PATHOLOGY DYSPHAGIA TREATMENT     Patient: Karl Soria (77 y.o. male)  Date: 4/5/2017  Diagnosis: Acute pulmonary edema (HCC) Systolic CHF, acute (HCC)       Precautions: Aspiration        ASSESSMENT:  Moderate Oropharyngeal dysphagia. The pt was alert during therapeutic feeding trials with wife in the room. Per wife, the pt refused PO intake of current tray d/t coughing with each bite. SLP attempted trials of MS ground with compensatory strategies, however pt refused ground textures. Agreed to participate with puree and [place]. Trials of puree were cleared with delayed pharyngeal swallow and multiple swallows; no overt s/sx of aspiration/penetration noted. Oral care completed prior to Qu[place]. Tolerated ice chips without event; pt pleased with PO of ice chips. Pt and family agreed with altered POC to modify diet to puree. Pt/family educated re: diet textures, compensatory strategies, [place] Protocol, oral apraxia, and aspiration precautions, and safe swallow guidelines. ST to f/u for diet tolerance, compensatory strategies, environmental modifications, and pt/caregiver education.       Progression toward goals:  [ ]         Improving appropriately and progressing toward goals  [ ]         Improving slowly and progressing toward goals  [X]         Not making progress toward goals and plan of care will be adjusted; diet modified        PLAN: Puree with Honey Thickened Liquids; [employer] Protocol  Recommendations and Planned Interventions:  ST to f/u for diet tolerance, compensatory strategies, environmental modifications, and pt/caregiver education. Patient continues to benefit from skilled intervention to address the above impairments. Continue treatment per established plan of care. Discharge Recommendations:  Skilled Nursing Facility       SUBJECTIVE:   Patient's wife stated [place] was coughing during the meal. Head nod to refuse mechanical soft ground textures. OBJECTIVE:   Cognitive and Communication Status:  Neurologic State: Alert  Orientation Level: Unable to verbalize  Cognition: Decreased command following, Appropriate for age attention/concentration  Perception: Cues to attend to right side of body  Perseveration: No perseveration noted  Safety/Judgement: Awareness of environment, Good awareness of safety precautions, Insight into deficits  Dysphagia Treatment:  Oral Assessment:  Oral Assessment  Labial: Decreased rate, Right droop  Dentition: Intact, Natural  Oral Hygiene: good  Lingual: Decreased rate  Velum: No impairment  Mandible: No impairment  P.O.  Trials:              Patient Position: HOB 60              Vocal quality prior to P.O.: Low volume, Wet              Consistency Presented: Ice chips, Puree              How Presented: SLP-fed/presented, Spoon              How Much:  (x5 puree; x2 ice chips)              Bolus Acceptance: No impairment              Bolus Formation/Control: Impaired              Type of Impairment: Mastication, Incomplete              Propulsion: Delayed (# of seconds)              Oral Residue: None              Initiation of Swallow: Delayed (# of seconds)              Laryngeal Elevation: Decreased, Functional              Aspiration Signs/Symptoms: Change vocal quality, Weak cough              Pharyngeal Phase Characteristics: Altered vocal quality, Audible swallow, Suspected pharyngeal residue, Double swallow              Effective Modifications: Small sips and bites, Spoon, Alternate liquids/solids              Cues for Modifications: Minimal                                Oral Phase Severity: Moderate              Pharyngeal Phase Severity : Moderate              PAIN:  Start of Tx: 0  End of Tx: 0     After treatment:   [ ]              Patient left in no apparent distress sitting up in chair  [X]              Patient left in no apparent distress in bed  [ ]              Call bell left within reach  [ ]              Nursing notified  [X]              Family present  [ ]              Caregiver present  [ ]              Bed alarm activated         COMMUNICATION/EDUCATION:   [X]        Aspiration precautions; swallow safety; compensatory techniques  [X]        Patient unable to participate in education; education ongoing with staff  [X]        Posted safety precautions in patient's room.   [ ]         Oral-motor/laryngeal strengthening exercises        JAYY Gaitan  Time Calculation: 23 mins

## 2017-04-05 NOTE — PROGRESS NOTES
1925: Assumed patient care, he was alert and oriented to person, but disorientated to place, time and situation. Vital signs were stable. MEWS score was a one. Respiratory status was stable on 2L via nasal cannula. Patient denied any pain, discomfort, nausea, vomiting, dizziness or anxiety. White board was updated and explained. Bed was locked and in lowest position. Call bell, water and personal belongings were within reach. Patient had no questions, comments, or concerns after bedside shift report.

## 2017-04-05 NOTE — ACP (ADVANCE CARE PLANNING)
Patient has medical Power of Arianne, Living Will and DDNR in EMR.   WALT is his wife, Oksana Hearing [phone]

## 2017-04-05 NOTE — PROGRESS NOTES
Problem: Aspiration - Risk of  Goal: *Absence of aspiration  Outcome: Not Progressing Towards Goal  Patient experiencing increased frequent coughing after meals and throat clearing.

## 2017-04-05 NOTE — PROGRESS NOTES
Hospitalist Progress Note-critical care note     Patient: Johanna Mast MRN: [id number]  CSN: [id number]    YOB: 1933  Age: 80 y.o. Sex: male    DOA: 4/2/2017 LOS:  LOS: 3 days            Chief complaint: chf exacerbation, pulmonary fibrosis. A fib     Assessment/Plan         Patient Active Problem List   Diagnosis Code    Chronic systolic heart failure (HCC) I50.22    A-fib ([facility]) I48.91    Type II or unspecified type diabetes mellitus without mention of complication, not stated as uncontrolled E11.9    History of CVA (cerebrovascular accident) Z80.78    CKD (chronic kidney disease) N18.9    Seizure disorder ([facility] [address] G40.909    Dysphagia R13.10    CHF (congestive heart failure) ([address] I50.9    Acute respiratory failure with hypercapnia ([facility]) J96.02    Aspiration pneumonia ([address] J69.0    Pulmonary fibrosis ([facility] [address] J84.10    Acute pulmonary edema ([facility]) B59.4    Systolic CHF, acute ([address] I50.21     1. CHF systolic acute. EF 45%. cardiology was on board,   Continue  lasix      2. Atrial fibrillation. On warfarin, rate controlled per coreg,inr daily 2.4 today     3. CKD. Continue monitor    4. Hx CVA. Fall precaution     5. Seizure disorder. Continue home meds     6. Hypothyroidism. Chronic thrombocytopenia. 7. Dysphagia. Dysphagia diet, aspiration precaution , high risk for aspiration      8 aphagia   Due to the stroke     9 debility  10 hypotension  Asymptomatic, will hold hypertension medication,  Decrease lasix to once a day. 11. Pulmonary fibrosis ,   Continue breathing tx. Will have chest PT and mucinex     12 constipation  Add colace/mirilax and fleet       Subjective; sob better, coughs     Family  was at the bedside   All questions have been answered. 35 total min's spent on patient care including >50% on counseling/coordinating care. Discussed the above assessments.  also discussed labs, medications and hospital course          Review of systems:    General: No fevers or chills. Cardiovascular: No chest pain or pressure. No palpitations. Pulmonary: No shortness of breath. Coughs   Gastrointestinal: No nausea, vomiting. Vital signs/Intake and Output:  Visit Vitals    /63 (BP 1 Location: Left arm, BP Patient Position: At rest)    Pulse 65    Temp 97.6 °F (36.4 °C)    Resp 15    Ht 5' 9\" (1.753 m)    Wt 60.5 kg (133 lb 6.1 oz)    SpO2 97%    BMI 19.7 kg/m2     Current Shift:     Last three shifts:  04/03 1901 - 04/05 0700  In: 960 [P.O.:960]  Out: 0     Physical Exam:  General: WD, WN. Alert, cooperative, no acute distress    HEENT: NC, Atraumatic. PERRLA, anicteric sclerae. Lungs: CTA Bilaterally. No Wheezing/Rhonchi/Rales. Heart:  Regular  rhythm,  + murmur, No Rubs, No Gallops  Abdomen: Soft, Non distended, Non tender.  +Bowel sounds,   Extremities: No c/c/e  Psych:   Not anxious or agitated. Neurologic:  No acute neurological deficit.              Labs: Results:       Chemistry Recent Labs      04/05/17 0520 04/04/17   0533 04/03/17   0100   GLU  118*  90  106*   NA  145  139  137   K  3.8  3.6  3.6   CL  104  102  102   CO2  34*  33*  31   BUN  24*  24*  26*   CREA  1.24  1.14  1.32*   CA  8.5  8.4*  8.1*   AGAP  7  4  4   BUCR  19  21*  20      CBC w/Diff Recent Labs      04/04/17   0533   WBC  13.2   RBC  4.00*   HGB  12.3*   HCT  37.8   PLT  93*      Cardiac Enzymes Recent Labs      04/03/17   1925  04/03/17   1346   CPK  81  78   CKND1  Cannot be calulated  Cannot be calulated      Coagulation Recent Labs      04/05/17 0520 04/04/17   0533   PTP  24.8*  25.9*   INR  2.4*  2.5*       Lipid Panel Lab Results   Component Value Date/Time    Cholesterol, total 110 09/03/2013 02:01 AM    HDL Cholesterol 42 09/03/2013 02:01 AM    LDL, calculated 56.6 09/03/2013 02:01 AM    VLDL, calculated 11.4 09/03/2013 02:01 AM    Triglyceride 57 09/03/2013 02:01 AM    CHOL/HDL Ratio 2.6 09/03/2013 02:01 AM      BNP No results for input(s): BNPP in the last 72 hours.   Liver Enzymes No results for input(s): TP, ALB, TBIL, AP, SGOT, GPT in the last 72 hours.     No lab exists for component: DBIL   Thyroid Studies Lab Results   Component Value Date/Time    TSH 1.75 01/09/2016 07:00 AM        Procedures/imaging: see electronic medical records for all procedures/Xrays and details which were not copied into this note but were reviewed prior to creation of Fox Phillips MD

## 2017-04-05 NOTE — PROGRESS NOTES
Pharmacy Dosing Services: Warfarin    Consult for Warfarin Dosing by Pharmacy by Dr. Elle Cheney provided for this 80 y.o.  male , for indication of Atrial Fibrillation. Dose to achieve an INR goal of 2-3    Order entered for Warfarin 4 mg to be given today at 18:00. Significant drug interactions: n/a  LABS    PT/INR Lab Results   Component Value Date/Time    INR 2.4 04/05/2017 05:20 AM      Platelets Lab Results   Component Value Date/Time    PLATELET 93 27/07/0396 05:33 AM      H/H     Lab Results   Component Value Date/Time    HGB 12.3 04/04/2017 05:33 AM        Warfarin Administrations (last 168 hours)       Date/Time Action Medication Dose      04/04/17 1956 Given    warfarin (COUMADIN) tablet 4 mg 4 mg    04/03/17 1743 Given    warfarin (COUMADIN) tablet 4 mg 4 mg    04/02/17 1726 Given    warfarin (COUMADIN) tablet 4 mg 4 mg          Pharmacy to follow daily and will provide subsequent Warfarin dosing based on clinical status.   CRISTINA Emanuel  Contact information [phone]

## 2017-04-05 NOTE — ROUTINE PROCESS
Bedside and Verbal shift change report given to SARMAD IbarraRN (oncoming nurse) by SUNNY Soria RN (offgoing nurse). Report included the following information SBAR, Intake/Output and MAR.

## 2017-04-06 NOTE — PROGRESS NOTES
Chart reviewed, noted 4-5-17 PT Eval with rec of home health vs rehab. Met with pt (sleeping) and his wife. Pt's wife stated that: she thought pt would benefit from a short stay for rehab. List of area SNFs provided to pt's wife who reported that:  [facility] was too far away; pt had been in [place] in the past and it was her first choice. FOC completed for, in order of preference: (1) [facility], (2) [facility], (3) [facility].  Referral placed to CMS; will await responses.

## 2017-04-06 NOTE — PROGRESS NOTES
SHIFT SUMMARY  Pt has been stable last four hours. Spouse has been tat the bedside for support. No major concerns at this point. Bedside shift change report given to [facility]. Report included the following information SBAR, Kardex, MAR and Recent Results.

## 2017-04-06 NOTE — PROGRESS NOTES
Problem: Dysphagia (Adult)  Goal: *Acute Goals and Plan of Care (Insert Text)  Dysphagia Present: moderate  Aspiration: at risk     Recommendations:  Diet: puree, honey-thick liquids  Meds: in aplesauce  Aspiration Precautions  Other: small sips/bites, slow rate, alternate solids/liquids  1:1 supervision with meals    Goals: Patient will:  1. Tolerate PO trials with 0 s/s overt distress in 4/5 trials  2. Utilize compensatory swallow strategies/maneuvers (decrease bite/sip, size/rate, alt. liq/sol) with min cues in 4/5 trials  3. Complete an objective swallow study (i.e., MBSS) to assess swallow integrity, r/o aspiration, and determine of safest LRD, min A        Outcome: Progressing Towards Goal  SPEECH LANGUAGE PATHOLOGY DYSPHAGIA TREATMENT     Patient: Francis Mukherjee (17 y.o. male)  Date: 4/6/2017  Diagnosis: Acute pulmonary edema (HCC) Systolic CHF, acute (HCC)       Precautions: aspiration Fall      ASSESSMENT:  Moderate oropharyngeal dysphagia     Dysphagia f/u completed with pt A&Ox3 via yes/no questions. Pt NPO s/p respiratory change last date. Pt with h/o x2 MBS at this facility, 1/2016 and 1/2017; both yielding consistent results of silent aspiration of nectar-thick and tolerance of honey-thick liquids and mech soft ground. Spouse stated on prior visit that pt not consuming thickened liquids in home; suspect contributing to multiple hospital admissions. Puree solids consumed with lingual pumping and delayed A-P transfer. Consistent x2 swallows/bolus with honey-thick liquids with intermittent throat clear appreciated. Pharyngeal swallow delayed and weak to palpation. D/w MD, will cautiously recommend resuming puree and honey-thick liquid diet. Will f/u with modified barium swallow study next date with results/recommendations to follow.        Progression toward goals:  [ ]         Improving appropriately and progressing toward goals  [X]         Improving slowly and progressing toward goals  [ ]         Not making progress toward goals and plan of care will be adjusted       PLAN: cautious puree and honey-thick liquid diet, swallow strategies  Recommendations and Planned Interventions:  [place] [facility] [place]/recommendations to follow  Patient continues to benefit from skilled intervention to address the above impairments. Continue treatment per established plan of care. Discharge Recommendations:  None       SUBJECTIVE:   Patient stated OK. OBJECTIVE:   Cognitive and Communication Status:  Neurologic State: Alert  Orientation Level: Oriented to person, Oriented to place, Oriented to situation  Cognition: Decreased command following, Appropriate for age attention/concentration  Perception: Appears intact  Perseveration: No perseveration noted  Safety/Judgement: Fall prevention  Dysphagia Treatment:  Oral Assessment:  Oral Assessment  Labial: Decreased rate, Right droop  Dentition: Intact, Natural  Oral Hygiene: good  Lingual: Decreased rate  Velum: No impairment  Mandible: No impairment  P.O.  Trials:              Patient Position: 90 EOB              Vocal quality prior to P.O.: No impairment              Consistency Presented: Honey thick liquid, Puree              How Presented: SLP-fed/presented, Self-fed/presented, Spoon              How Much:  (meal)              Bolus Acceptance: No impairment              Bolus Formation/Control: Impaired              Type of Impairment: Delayed, Mastication              Propulsion: Delayed (# of seconds), Discoordination              Oral Residue: None              Initiation of Swallow: Delayed (# of seconds)              Laryngeal Elevation: Decreased              Aspiration Signs/Symptoms: Clear throat              Pharyngeal Phase Characteristics: Double swallow, Suspected pharyngeal residue              Effective Modifications: Small sips and bites, Double swallow              Cues for Modifications: Minimal                                Oral Phase Severity: Moderate              Pharyngeal Phase Severity : Moderate                                                                                                                                                                                                                                                                                                                                                                                                                                                                                                                                                                                                                                                                                                                                                                                                                                                                                                                                                                    PAIN:  Start of Tx: 0  End of Tx: 0      After treatment:   [ ]              Patient left in no apparent distress sitting up in chair  [X]              Patient left in no apparent distress in bed  [X]              Call bell left within reach  [X]              MD notified  [ ]              Family present  [ ]              Caregiver present  [ ]              Bed alarm activated         COMMUNICATION/EDUCATION:   [X]        Aspiration precautions; swallow safety; compensatory techniques  [ ]        Patient unable to participate in education; education ongoing with staff  [ ]         Posted safety precautions in patient's room.   [ ]         Oral-motor/laryngeal strengthening exercises        JAYY Fermin  Time Calculation: 25 mins

## 2017-04-06 NOTE — PROGRESS NOTES
1930: Assumed patient care, he was alert and oriented to person, but disorientated to place, time and situation. Vital signs were stable. MEWS score was a one. Respiratory status was stable on 2L via nasal cannula. Patient denied any pain, discomfort, nausea, vomiting, dizziness or anxiety. White board was updated and explained. Bed was locked and in lowest position. Call bell, water and personal belongings were within reach. Patient had no questions, comments, or concerns after bedside shift report. 0700: Patient had an uneventful shift. Respiratory status, vital signs and MEWS score remained stable. No complaints of nausea, vomiting dizziness or anxiety reported. Patient appeared to be sleeping with no signs of distress noted.

## 2017-04-06 NOTE — PROGRESS NOTES
Pharmacy Dosing Services: Warfarin    Consult for Warfarin Dosing by Pharmacy by Dr. Autumn Owen provided for this 80 y.o.  male , for indication of Atrial Fibrillation. Dose to achieve an INR goal of 2-3    Order entered for Warfarin 3 mg to be given today at 18:00. Significant drug interactions: levofloxacin  LABS    PT/INR Lab Results   Component Value Date/Time    INR 2.7 04/06/2017 01:41 AM      Platelets Lab Results   Component Value Date/Time    PLATELET 029 70/72/3193 01:41 AM      H/H     Lab Results   Component Value Date/Time    HGB 13.0 04/06/2017 01:41 AM          Note: warfarin was held on 4/5/17 by RN, patient was NPO    Warfarin Administrations (last 168 hours)       Date/Time Action Medication Dose      04/04/17 1956 Given    warfarin (COUMADIN) tablet 4 mg 4 mg    04/03/17 1743 Given    warfarin (COUMADIN) tablet 4 mg 4 mg    04/02/17 1726 Given    warfarin (COUMADIN) tablet 4 mg 4 mg          Pharmacy to follow daily and will provide subsequent Warfarin dosing based on clinical status.   CRISTINA Goddard  Contact information [phone]

## 2017-04-06 NOTE — ROUTINE PROCESS
1530 Bedside and Verbal shift change report given to Montana Otero (oncoming nurse) by Turner Mnoroe (offgoing nurse). Report included the following information SBAR, Kardex, Intake/Output, MAR and Recent Results.

## 2017-04-06 NOTE — PROGRESS NOTES
Hospitalist Progress Note-critical care note     Patient: Terrie Martinez MRN: [id number]  CSN: [id number]    YOB: 1933  Age: 80 y.o. Sex: male    DOA: 4/2/2017 LOS:  LOS: 4 days            Chief complaint: chf exacerbation, pulmonary fibrosis. A fib , aspiration pneumonia ,dysphagia     Assessment/Plan         Patient Active Problem List   Diagnosis Code    Chronic systolic heart failure ([facility]) I50.22    A-fib ([facility]) I48.91    Type II or unspecified type diabetes mellitus without mention of complication, not stated as uncontrolled E11.9    History of CVA (cerebrovascular accident) Z80.78    CKD (chronic kidney disease) N18.9    Seizure disorder ([address] G40.909    Dysphagia R13.10    CHF (congestive heart failure) ([facility]) I50.9    Acute respiratory failure with hypercapnia ([facility]) J96.02    Aspiration pneumonia ([address] J69.0    Pulmonary fibrosis ([address] J84.10    Acute pulmonary edema ([facility]) S22.4    Systolic CHF, acute ([address] I50.21    Constipation K59.00     1. CHF systolic acute. EF 45%. cardiology was on board,   Continue  lasix      2. Atrial fibrillation. On warfarin, rate controlled per coreg,inr daily 2.7  today     3. CKD. Continue monitor    4. Hx CVA. Fall precaution     5. Seizure disorder. Continue home meds ,change to iv     6. Hypothyroidism. Chronic thrombocytopenia. 7. Dysphagia. Dysphagia diet, aspiration precaution , will have barium swallowing test, keep npo till the results.       8 aphagia   Due to the stroke     9 debility  10 hypotension  Resolved     11. Pulmonary fibrosis ,   Continue breathing tx. Continue  chest PT and mucinex     12 constipation    Continue colace/mirilax    13 aspiration pna  Continue levaquin and aspiration precaution. Subjective; sob better,     Wife   was at the bedside           Review of systems:    General: No fevers or chills. Cardiovascular: No chest pain or pressure. No palpitations. Pulmonary: No shortness of breath. Coughs   Gastrointestinal: No nausea, vomiting. Vital signs/Intake and Output:  Visit Vitals    /54 (BP 1 Location: Left arm, BP Patient Position: At rest;Supine; Head of bed elevated (Comment degrees))    Pulse 82    Temp 97.9 °F (36.6 °C)    Resp 14    Ht 5' 9\" (1.753 m)    Wt 57.1 kg (125 lb 14.1 oz)    SpO2 98%    BMI 18.59 kg/m2     Current Shift:     Last three shifts:  04/04 1901 - 04/06 0700  In: 820 [P.O.:720; I.V.:100]  Out: 250 [Urine:250]    Physical Exam:  General: WD, WN. Alert, cooperative, no acute distress    HEENT: NC, Atraumatic. PERRLA, anicteric sclerae. Lungs: Mild rales, no wheezing   Heart:  Regular  rhythm,  + murmur, No Rubs, No Gallops  Abdomen: Soft, Non distended, Non tender.  +Bowel sounds,   Extremities: No c/c/e  Psych:   Not anxious or agitated. Neurologic:  No acute neurological deficit. Labs: Results:       Chemistry Recent Labs      04/06/17 0141 04/05/17 0520 04/04/17   0533   GLU  127*  118*  90   NA  145  145  139   K  3.9  3.8  3.6   CL  103  104  102   CO2  37*  34*  33*   BUN  24*  24*  24*   CREA  1.33*  1.24  1.14   CA  8.5  8.5  8.4*   AGAP  5  7  4   BUCR  18  19  21*      CBC w/Diff Recent Labs      04/06/17 0141 04/04/17   0533   WBC   --   13.2   RBC   --   4.00*   HGB  13.0  12.3*   HCT  40.1  37.8   PLT  105*  93*      Cardiac Enzymes Recent Labs      04/03/17   1925  04/03/17   1346   CPK  81  78   CKND1  Cannot be calulated  Cannot be calulated      Coagulation Recent Labs      04/06/17 0141 04/05/17   0520   PTP  27.5*  24.8*   INR  2.7*  2.4*       Lipid Panel Lab Results   Component Value Date/Time    Cholesterol, total 110 09/03/2013 02:01 AM    HDL Cholesterol 42 09/03/2013 02:01 AM    LDL, calculated 56.6 09/03/2013 02:01 AM    VLDL, calculated 11.4 09/03/2013 02:01 AM    Triglyceride 57 09/03/2013 02:01 AM    CHOL/HDL Ratio 2.6 09/03/2013 02:01 AM      BNP No results for input(s): BNPP in the last 72 hours. Liver Enzymes No results for input(s): TP, ALB, TBIL, AP, SGOT, GPT in the last 72 hours.     No lab exists for component: DBIL   Thyroid Studies Lab Results   Component Value Date/Time    TSH 1.75 01/09/2016 07:00 AM        Procedures/imaging: see electronic medical records for all procedures/Xrays and details which were not copied into this note but were reviewed prior to creation of Juliette Gallegos MD

## 2017-04-06 NOTE — PROGRESS NOTES
Problem: Self Care Deficits Care Plan (Adult)  Goal: *Acute Goals and Plan of Care (Insert Text)  Outcome: Resolved/Met Date Met:  04/06/17  OCCUPATIONAL THERAPY EVALUATION/DISCHARGE     Patient: Madi Dias (87 y.o. male)  Date: 4/6/2017  Primary Diagnosis: Acute pulmonary edema (HCC)        Precautions:   Fall      ASSESSMENT AND RECOMMENDATIONS:  Based on the objective data described below, the patient presents with ability to perform ADL tasks at baseline level. Pt requires extensive assistance with LB ADLs and toileting at baseline from wife. Pt reports incontinence and use of briefs at home. Pt completed supine to sit transfer with CGA. Pt completed sit to stand transfer with CGA. While standing, pt required max-total A for bowel/bladder hygiene and to don/doff brief. Pt took side steps at EOB with CGA. Pt left seated EOB with needs in reach and PT present. Pt reports he feels he is at baseline level with ADL tasks. Pt with no further OT/ADL concerns at this time. Skilled occupational therapy is not indicated at this time.      Education: Role of OT in acute care, plan of care     Discharge Recommendations: [facility]  Further Equipment Recommendations for Discharge: TBD       SUBJECTIVE:   Patient stated .      OBJECTIVE DATA SUMMARY:       Past Medical History:   Diagnosis Date    [facility]      CAD (coronary artery disease)      Heart failure [facility]      High cholesterol      Hypertension      Kidney stone      Seizure ([facility]      Stroke [facility])       Past Surgical History:   Procedure Laterality Date    CARDIAC SURG PROCEDURE UNLIST         aneurysm repair    HX UROLOGICAL         kidney stone removed     Barriers to Learning/Limitations: yes;  sensory deficits-vision/hearing/speech and physical  Compensate with: visual, verbal, tactile, kinesthetic cues/model  GCODES(GO):n/a  Prior Level of Function/Home Situation: Assist with ADLs  Home Situation  Home Environment: Trailer/mobile home  # Steps to Enter: 4  One/Two Story Residence: One story  Living Alone: No  Support Systems: Family member(s), Spouse/Significant Other/Partner  Patient Expects to be Discharged to[de-identified] Private residence  Current DME Used/Available at Home: Walker, rolling, Grab bars, Safety frame WHILL     Cognitive/Behavioral Status:  Neurologic State: Alert  Orientation Level: Unable to verbalize  Cognition: Follows commands  Safety/Judgement: Fall prevention  Coordination:  Coordination: Generally decreased, functional       Gross Motor Skills-Upper: Left Intact; Right Intact  Balance:  Sitting: Intact  Standing: Impaired; With support  Standing - Static: Good  Standing - Dynamic : Fair  Strength:  Strength: Generally decreased, functional     Tone & Sensation:  Tone: Normal  Sensation: Intact     Range of Motion:  AROM: Generally decreased, functional  PROM: Generally decreased, functional     Functional Mobility and Transfers for ADLs:  Bed Mobility:  Supine to Sit: Contact guard assistance     Transfers:  Sit to Stand: Contact guard assistance                 ADL Assessment:  Lower Body Dressing: Maximum assistance; Total assistance     Toileting: Maximum assistance; Total assistance     ADL Intervention:  Lower Body Dressing Assistance  Dressing Assistance: Maximum assistance; Total assistance(dependent)  Protective Undergarmet: Maximum assistance; Total assistance (dependent)     Toileting  Toileting Assistance: Maximum assistance; Total assistance(dependent)  Bladder Hygiene: Maximum assistance; Total assistance (dependent)     Cognitive Retraining  Safety/Judgement: Fall prevention     Pain:  Pre-treatment: 0  Post-treatment: 0  Activity Tolerance:   fair  Please refer to the flowsheet for vital signs taken during this treatment.   After treatment:   [ ]  Patient left in no apparent distress sitting up in chair  [X]  Patient left in no apparent distress  With PT  [X]  Call bell left within reach  [ ]  Nursing notified  [ ]  Caregiver present  [ ]  Bed alarm activated      COMMUNICATION/EDUCATION:   Communication/Collaboration:  [X]      Home safety education was provided and the patient/caregiver indicated understanding. [X]      Patient/family have participated as able and agree with findings and recommendations. [ ]      Patient is unable to participate in plan of care at this time.      Maya Baltazar MS OTR/L  Time Calculation: 23 mins

## 2017-04-06 NOTE — PROGRESS NOTES
0730 Assumed care of patient from [facility] Dickey. 8072 All po medication held this morning, patient NPO for high risk of aspiration.

## 2017-04-06 NOTE — PROGRESS NOTES
Problem: Mobility Impaired (Adult and Pediatric)  Goal: *Acute Goals and Plan of Care (Insert Text)  Physical Therapy Goals  Initiated 4/5/2017 and to be accomplished within 2-8 day(s)  1. Patient will move from supine to sit and sit to supine in bed with supervision/CGA. 2. Patient will transfer from bed to chair and chair to bed with supervision/CGA using the least restrictive device. 3. Patient will perform sit to stand with supervision/CGA. 4. Patient will ambulate with supervision/CGA for 150 feet with the least restrictive device. 5. Patient will ascend/descend 4 stairs with use of handrail(s) with minimal assistance/contact guard assist.   Outcome: Progressing Towards Goal  PHYSICAL THERAPY TREATMENT     Patient: Chelsy Nunez (77 y.o. male)  Date: 4/6/2017  Diagnosis: Acute pulmonary edema (HCC) Systolic CHF, acute (HCC)       Precautions: Fall   Chart, physical therapy assessment, plan of care and goals were reviewed. ASSESSMENT:  Patient is not doing as well as yesterday with mobility. This session the patient was seen with OT. Patient had urinated in bed, assisted to change the patient's bed linens and brief. Patient then ambulated in the hallway for about 100 feet with CGA. Gait is slow and shuffled in appearance, but steady with no loss of balance. Patient took a standing rest break before returning back to his room. O2 saturations on 3L O2 were inconsistent, ranging from 78-88%. Unsure of the accuracy of the pulse oximeter this session. Returned the patient back to his room, seated up at edge of bed with speech about to enter his room. Will continue PT and progress ambulatory mobility as tolerated.   Progression toward goals:  [ ]      Improving appropriately and progressing toward goals  [X]      Improving slowly and progressing toward goals  [ ]      Not making progress toward goals and plan of care will be adjusted       PLAN:  Patient continues to benefit from skilled intervention to address the above impairments. Continue treatment per established plan of care. Discharge Recommendations:  Rehab vs Home Health  Further Equipment Recommendations for Discharge:  N/A       SUBJECTIVE:   Patient stated Okay.       OBJECTIVE DATA SUMMARY:   Critical Behavior:  Neurologic State: Alert  Orientation Level: Unable to verbalize  Cognition: Follows commands  Safety/Judgement: Fall prevention  Functional Mobility Training:  Bed Mobility:  Supine to Sit: Contact guard assistance  Transfers:  Sit to Stand: Contact guard assistance  Stand to Sit: Contact guard assistance  Balance:  Sitting: Intact  Standing: Impaired; With support  Standing - Static: Good  Standing - Dynamic : Fair  Ambulation/Gait Training:  Distance (ft): 100 Feet (ft)  Assistive Device: Gait belt;Walker, rolling  Ambulation - Level of Assistance: Contact guard assistance  Gait Abnormalities: Decreased step clearance;Shuffling gait  Base of Support: Narrowed  Stance: Time  Speed/Aurelia: Slow  Step Length: Right shortened;Left shortened  Swing Pattern: Right asymmetrical;Left asymmetrical     Pain:  Pain Scale 1: Numeric (0 - 10)  Pain Intensity 1: 0  Activity Tolerance:   Fair  Please refer to the flowsheet for vital signs taken during this treatment.   After treatment:   [ ] Patient left in no apparent distress sitting up in chair  [X] Patient left in no apparent distress in bed  [X] Call bell left within reach  [X] Nursing notified  [ ] Caregiver present  [ ] Bed alarm activated      Rody Castro   Time Calculation: 30 mins

## 2017-04-06 NOTE — PROGRESS NOTES
Palliative Medicine Progress Note  [facility]: [phone])  [facility] [phone]   [facility]: [phone]    Patient Name: Barber Vicente  YOB: 1933    Date of Initial Consult: 4/5/17  Reason for Consult: care decisions  Requesting Provider: Dr. Izaguirre    Primary Care Physician: Elaine Martin MD      SUMMARY:   Barber Vicente is a 80y.o. 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 Walking w/ cane and feeding self at home, though wife says he never completely got over last hospitalization. Prior to then had had about four hospitalizations for aspiration since major stroke in 2008. No other hospitalization in last year. Has lost significant wt in last six months. 4/6/17: Feels well, worked w/ PT yesterday. NPO due to results of MBS. It seems his dysphagia is worsening. PALLIATIVE DIAGNOSES:     Patient Active Problem List   Diagnosis Code    Chronic systolic heart failure (HCC) I50.22    A-fib ([facility]) I48.91    Type II or unspecified type diabetes mellitus without mention of complication, not stated as uncontrolled E11.9    History of CVA (cerebrovascular accident) Z80.78    CKD (chronic kidney disease) N18.9    Seizure disorder ([facility] G40.909    Dysphagia R13.10    CHF (congestive heart failure) ([facility]) I50.9    Acute respiratory failure with hypercapnia ([facility]) J96.02    Aspiration pneumonia ([address] J69.0    Pulmonary fibrosis ([address] J84.10    Acute pulmonary edema ([facility]) Q16.4    Systolic CHF, acute ([facility] I50.21    Constipation K59.00     1. PLAN:   1. Met with patient and his wife. He was largely non verbal. Reviewed clinical course and possibility that rapid readmission for aspiration may be a marker for worsening aspiration and general decline. Has AMD and DDNR on chart.  She is not certain if he will be willing to discharge to SNF rather than home with PT if it is recommended. May derive only marginal benefit. Should he go home and certainly in the longer run she will need additional personal home care. Admission to SNF carries risk of hospital readmission. He denies current sxs. Questions answered. 4/6/17: Will likely have to choose whether to have PEG placed. Discussed the pros and cons of proceeding. PEG will not eliminate risk of aspiration, but probably help him maintain body weight. Could opt for tube and continue to have comfort feedings orally as long as they accept the risk. Questions answered. 2. Initial consult note routed to primary continuity provider  3. Communicated plan of care with: Palliative IDT       GOALS OF CARE:   Curative w/ limitations    Advance Care Planning 4/5/2017   Patient's Healthcare Decision Maker is: Named in scanned ACP document   Primary Decision Maker Name Demetrius Moyer   Primary Decision Maker Phone Number [phone]   Primary Decision Maker Relationship to Patient Spouse   Confirm Advance Directive Yes, on file   Does the patient have other document types Do Not Resuscitate; Power of [employer]; Other (comment)           HISTORY:     History obtained from: patient and wife    CHIEF COMPLAINT: see summary    HPI/SUBJECTIVE:    The patient is:   [] Verbal and participatory  [x] Non-participatory due to:   Minimal verbal response     Clinical Pain Assessment (nonverbal scale for nonverbal patients):    denies       FUNCTIONAL ASSESSMENT:     Palliative Performance Scale (PPS): 40          PSYCHOSOCIAL/SPIRITUAL SCREENING:     Advance Care Planning:  Advance Care Planning 4/5/2017   Patient's Healthcare Decision Maker is: Named in scanned ACP document   Primary Decision Maker Name Demetrius Moyer   Primary Decision Maker Phone Number [phone]   Primary Decision Maker Relationship to Patient Spouse   Confirm Advance Directive Yes, on file   Does the patient have other document types Do Not Resuscitate; Power of [employer]; Other (comment)        Any spiritual / Orthodox concerns:  [] Yes /  [x] No    Caregiver Burnout:  [] Yes /  [x] No /  [] No Caregiver Present      Anticipatory grief assessment:   [] Normal  / [] Maladaptive       ESAS Anxiety:      ESAS Depression:          REVIEW OF SYSTEMS:     Positive and pertinent negative findings in ROS are noted above in HPI. The following systems were [] reviewed / [x] unable to be reviewed as noted in HPI  Other findings are noted below. Systems: constitutional, ears/nose/mouth/throat, respiratory, gastrointestinal, genitourinary, musculoskeletal, integumentary, neurologic, psychiatric, endocrine. Positive findings noted below. Modified ESAS Completed by: provider                                   Stool Occurrence(s): 1        PHYSICAL EXAM:     Wt Readings from Last 3 Encounters:   04/06/17 57.1 kg (125 lb 14.1 oz)   01/03/17 65.2 kg (143 lb 12.8 oz)   01/12/16 69.9 kg (154 lb)     Blood pressure 102/54, pulse 82, temperature 97.9 °F (36.6 °C), resp. rate 14, height 5' 9\" (1.753 m), weight 57.1 kg (125 lb 14.1 oz), SpO2 98 %. Pain:  Pain Scale 1: Numeric (0 - 10)  Pain Intensity 1: 0                 Last bowel movement: yesterday    Constitutional: frail, NAD  Eyes: pupils equal, anicteric  ENMT: no nasal discharge, moist mucous membranes  Cardiovascular: regular rhythm, distal pulses intact  Respiratory: breathing not labored, symmetric  Gastrointestinal: soft non-tender, +bowel sounds  Musculoskeletal: no deformity, no tenderness to palpation  Skin: warm, dry  Neurologic: following commands, moving all extremities.  R hemiparesis\  Psychiatric: full affect, no hallucinations  Other:       HISTORY:     Principal Problem:    Systolic CHF, acute ([facility] (4/3/2017)    Active Problems:    A-fib ([facility] [facility] (12/3/2012)      History of CVA (cerebrovascular accident) (12/4/2012)      CKD (chronic kidney disease) (12/4/2012)      Seizure disorder ([facility] [address] (9/2/2013)      Dysphagia (1/12/2016)      Acute pulmonary edema ([facility] (4/2/2017)      Constipation (4/5/2017)      Past Medical History:   Diagnosis Date    Atrial fibrillation ([facility] [facility]     CAD (coronary artery disease)     Heart failure (HCC)     High cholesterol     Hypertension     Kidney stone     [facility]     Stroke [facility])       Past Surgical History:   Procedure Laterality Date    CARDIAC SURG PROCEDURE UNLIST      aneurysm repair    HX UROLOGICAL      kidney stone removed      History reviewed. No pertinent family history. History reviewed, no pertinent family history.   Social History   Substance Use Topics    Smoking status: Former Smoker    Smokeless tobacco: Not on file    Alcohol use No     No Known Allergies   Current Facility-Administered Medications   Medication Dose Route Frequency    polyethylene glycol (MIRALAX) packet 17 g  17 g Oral BID    docusate sodium (COLACE) capsule 100 mg  100 mg Oral DAILY    guaiFENesin ER (MUCINEX) tablet 600 mg  600 mg Oral Q12H    metoprolol (LOPRESSOR) injection 1.25 mg  1.25 mg IntraVENous Q6H PRN    furosemide (LASIX) injection 40 mg  40 mg IntraVENous BID    phenytoin (DILANTIN) injection 100 mg  100 mg IntraVENous Q8H    digoxin (LANOXIN) injection 100 mcg  100 mcg IntraVENous DAILY    levoFLOXacin (LEVAQUIN) 500 mg in D5W IVPB  500 mg IntraVENous Q24H    valsartan (DIOVAN) tablet 20 mg  20 mg Oral DAILY    budesonide (PULMICORT) 500 mcg/2 ml nebulizer suspension  500 mcg Nebulization BID RT    albuterol-ipratropium (DUO-NEB) 2.5 MG-0.5 MG/3 ML  3 mL Nebulization Q4H PRN    atorvastatin (LIPITOR) tablet 40 mg  40 mg Oral DAILY    carvedilol (COREG) tablet 3.125 mg  3.125 mg Oral BID WITH MEALS    levothyroxine (SYNTHROID) tablet 50 mcg  50 mcg Oral ACB    spironolactone (ALDACTONE) tablet 25 mg  25 mg Oral DAILY    acetaminophen (TYLENOL) tablet 650 mg  650 mg Oral Q4H PRN    ondansetron (ZOFRAN) injection 4 mg  4 mg IntraVENous Q4H PRN    Warfarin - Pharmacy to Dose  1 Each Other Rx Dosing/Monitoring        LAB AND IMAGING FINDINGS:     Lab Results   Component Value Date/Time    WBC 13.2 04/04/2017 05:33 AM    HGB 13.0 04/06/2017 01:41 AM    PLATELET 161 43/30/2733 01:41 AM     Lab Results   Component Value Date/Time    Sodium 145 04/06/2017 01:41 AM    Potassium 3.9 04/06/2017 01:41 AM    Chloride 103 04/06/2017 01:41 AM    CO2 37 04/06/2017 01:41 AM    BUN 24 04/06/2017 01:41 AM    Creatinine 1.33 04/06/2017 01:41 AM    Calcium 8.5 04/06/2017 01:41 AM    Magnesium 1.8 04/06/2017 01:41 AM    Phosphorus 2.8 12/03/2009 03:28 AM      Lab Results   Component Value Date/Time    AST (SGOT) 21 04/02/2017 03:40 AM    Alk.  phosphatase 71 04/02/2017 03:40 AM    Protein, total 7.1 04/02/2017 03:40 AM    Albumin 2.8 04/02/2017 03:40 AM    Globulin 4.3 04/02/2017 03:40 AM     Lab Results   Component Value Date/Time    INR 2.7 04/06/2017 01:41 AM    Prothrombin time 27.5 04/06/2017 01:41 AM    aPTT 51.0 04/02/2017 03:40 AM      No results found for: IRON, FE, TIBC, IBCT, PSAT, FERR   No results found for: PH, PCO2, PO2  No components found for: [place]   Lab Results   Component Value Date/Time    CK 81 04/03/2017 07:25 PM    CK - MB <1.0 04/03/2017 07:25 PM              Total time: 25 min  Counseling / coordination time: 22  > 50% counseling / coordination      Liliana Gunn MD  Palliative Medicine

## 2017-04-07 NOTE — PROGRESS NOTES
Shift Summary:  Pt had an uneventful night. No distress noted. Pt rested throughout the night. Bedside and Verbal shift change report given to Matthew RN (oncoming nurse) by Mei Justice RN   (offgoing nurse). Report included the following information SBAR, Kardex and MAR.

## 2017-04-07 NOTE — PROGRESS NOTES
Problem: Dysphagia (Adult)  Goal: *Acute Goals and Plan of Care (Insert Text)  Dysphagia Present: moderate  Aspiration: at risk     Recommendations:  Diet: puree, honey-thick liquids  Meds: in aplesauce  Aspiration Precautions  Other: small sips/bites, slow rate, alternate solids/liquids  1:1 supervision with meals    Goals: Patient will:  1. Tolerate PO trials with 0 s/s overt distress in 4/5 trials  2. Utilize compensatory swallow strategies/maneuvers (decrease bite/sip, size/rate, alt. liq/sol) with min cues in 4/5 trials  3. Complete an objective swallow study (i.e., MBSS) to assess swallow integrity, r/o aspiration, and determine of safest LRD, min A        Outcome: Progressing Towards Goal  SPEECH PATHOLOGY MODIFIED BARIUM SWALLOW STUDY     Patient: Zion Gamble (48 y.o. male)  Date: 4/7/2017  Primary Diagnosis: Acute pulmonary edema (HCC)        Precautions: Aspiration. Fall      ASSESSMENT :  MBS completed with silent aspiration visualized. Pt's demeanor appeared different from bedside; question fatigue r/t completion of PT prior to evaluation. Penetration to the laryngeal vestibule visualized with puree and honey thickened liquids during swallow; 10-50% pharyngeal residue noted without aspiration. In isolation two trials of puree and two trials of honey thick tolerated with only trace penetration event. However, when pt propelled puree bolus posterior with absent swallow initiation resulting in pooling of bolus into the valleculae; honey thick liquid wash resulted in moderate penetration which pt successfully clear with spontaneous throat clear. Subsequent attempt at regular solid resulted in similar absent pharyngeal swallow response despite groping attempts with honey thickened wash to facilitate swallow, silent aspiration occurred. 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  Feeder must confirm readiness to participate for successful bolus clearance. Typical swallow pattern is: one full teaspoon of puree or HTL, swallow, cough/throat clear, swallow again. Benefits from larger bolus sizes to initiate the swallow. Recommend continue on cautious puree diet with honey thickened liquids. Per pt/family, poor daily PO intake, SLP questions if pt will meet nutritional needs. May benefit from alternative means of nutrition to supplement PO intake. Due to pt's age and diagnosis, prognosis is limited for puree with honey thickened liquids as baseline is impaired functioning (PMH CVA with diet of Mechanical Soft Ground) so risks/benefits should be considered from quality of life standpoint. Pt presents with moderate to moderate/severe oropharyngeal dysphagia, as evidenced above, which places pt at risk for aspiration. At this time, safest for puree solids, honey thickened liquid diet. SLP utilized video of study for visual feedback, education, and recommendations for pt; indicated comprehension via head nodding. Patient will benefit from skilled intervention to address the above impairments. Patients rehabilitation potential is considered to be Fair  Factors which may influence rehabilitation potential include:   [ ]              None noted  [ ]              Mental ability/status  [X]              Medical condition  [X]              Home/family situation and support systems  [ ]              Safety awareness  [ ]              Pain tolerance/management  [X]              Other: oral apraxia       PLAN : Puree with Honey Thickened Liquids   Recommendations and Planned Interventions:  ST f/u 1-2 visits from diet tolerance and patient/caregiver education. Frequency/Duration: Patient will be followed by speech-language pathology 1-2 times per day/4-7 days per week to address goals. Discharge Recommendations: Skilled Nursing Facility       SUBJECTIVE:   Patient stated . Tabitha Samuels.       OBJECTIVE:       Past Medical History:   Diagnosis Date    Atrial fibrillation ([facility]      CAD (coronary artery disease)      Heart failure (HCC)      High cholesterol      Hypertension      Kidney stone      Seizure ([facility]      Stroke [facility])       Past Surgical History:   Procedure Laterality Date    CARDIAC SURG PROCEDURE UNLIST         aneurysm repair    HX UROLOGICAL         kidney stone removed     Prior Level of Function/Home Situation: Per pt's caregiver/chart  Home Situation  Home Environment: Trailer/mobile home  # Steps to Enter: 4  One/Two Story Residence: One story  Living Alone: No  Support Systems: Family member(s), Spouse/Significant Other/Partner  Patient Expects to be Discharged to[de-identified] Private residence  Current DME Used/Available at Home: Walker, rolling, Grab bars, Safety frame toliet  Diet prior to admission: Mechanical Soft Ground with Honey Thickened Liquids   Current Diet:  Puree with Honey Thickened Liquids    Radiologist: HRRA  Film Views: Lateral;Fluoro  Patient Position: upright in fluoro chair      Trial 1: Trial 2:   Consistency Presented: Puree Consistency Presented: Honey thick liquid   How Presented: Spoon;SLP-fed/presented How Presented: SLP-fed/presented;Cup/sip;Spoon   Consistency Amount:  (x3 puree) Consistency Amount:  (x2)   Bolus Acceptance: No impairment Bolus Acceptance: No impairment   Bolus Formation/Control: Impaired: Delayed;Premature spillage;Mastication Bolus Formation/Control: Impaired: Premature spillage;Delayed   Propulsion: Discoordination;Delayed (# of seconds); Tongue pumping Propulsion: Delayed (# of seconds)   Oral Residue: None Oral Residue: None   Initiation of Swallow: Triggered at vallecula Initiation of Swallow: Triggered at pyriform sinus(es)   Timing: Pooling 6-10 sec (with puree and solid) Timing: Pooling 6-10 sec   Penetration: None Penetration: Trace; To laryngeal vestibule;Before swallow   Aspiration/Timing: No evidence of aspiration Aspiration/Timing: No evidence of aspiration   Pharyngeal Clearance: 10-50%; Vallecular residue Pharyngeal Clearance: Pyriform residue ;10-50%   Attempted Modifications: Alternate liquids/solids Attempted Modifications: Small sips and bites   Effective Modifications: None Effective Modifications: None         Comments:  (Inconsistently responds to commands) Comments:  (bigger bolus benefited pt )       Trial 3: Trial 4:   Consistency Presented: Solid; Honey thick liquid Consistency Presented: Puree; Honey thick liquid   How Presented: SLP-fed/presented;Cup/sip How Presented: Self-fed/presented;Spoon   Consistency Amount:  (x1 solid and x1 htl) Consistency Amount:  (x1 puree; x1 honey thick)   Bolus Acceptance: No impairment Bolus Acceptance: No impairment   Bolus Formation/Control: Impaired: Delayed;Premature spillage;Mastication Bolus Formation/Control: Impaired: Delayed;Premature spillage;Mastication   Propulsion: Delayed (# of seconds); Discoordination; Tongue pumping Propulsion: Delayed (# of seconds); Discoordination; Tongue pumping   Oral Residue: None Oral Residue: None   Initiation of Swallow: Absent;Triggered at pyriform sinus(es) (0 response with solid; utilized washj)     Timing: Response >30 sec;Pyriform sinus;Vallecular Timing: Vallecular;Response >30 sec   Penetration: Before swallow; To laryngeal vestibule Penetration: During swallow; To laryngeal vestibule   Aspiration/Timing: Silent ;During;From initial swallow Aspiration/Timing: No evidence of aspiration   Pharyngeal Clearance: 10-50%; Vallecular residue Pharyngeal Clearance: Less than 10%; Vallecular residue   Attempted Modifications: Alternate liquids/solids;Small sips and bites Attempted Modifications:  Alternate liquids/solids   Effective Modifications: None Effective Modifications: None   Cues for Modifications: Maximum Cues for Modifications: Maximum   Comments: decreased command following        Decreased Tongue Base Retraction?: Yes  Laryngeal Elevation: Inadequate epiglottic inversion; Incomplete laryngeal closure; Reduced excursion with laryngeal vestibule gap  Aspiration/Penetration Score: 8 (Aspiration-Contrast passes cords/glottis with no effort to eject, ie/silent aspiration)     Pharyngeal-Esophageal Segment: No impairment  Pharyngeal Dysfunction: Decreased tongue base retraction;Decreased strength;Decreased elevation/closure     Oral Phase Severity: Moderate  Pharyngeal Phase Severity: Moderate     GCODESwallowing:  Swallow Current Status CL= 60-79%   Swallow Goal Status CK= 40-59%     The severity rating is based on the following outcomes:  JEN Noms Swallow Level 3              8-point Penetration-Aspiration Scale: Score 8  Clinical Judgement     PAIN:  Start of Study: 0 reported   End of Study: 0 reported       COMMUNICATION/EDUCATION:   [X]  Aspiration risks/precautions; compensatory swallow techniques  [X]  Patient/family have participated as able in goal setting and plan of care. [X]  Patient/family agree to work toward stated goals and plan of care. [ ]  Patient understands intent and goals of therapy, but is neutral about his/her participation. [ ]  Patient is unable to participate in goal setting and plan of care.      Thank you for this referral.  Mahogany Horton, SLP  Time Calculation: 20 mins

## 2017-04-07 NOTE — PROGRESS NOTES
NUTRITION FOLLOW-UP    RECOMMENDATIONS/PLAN:   - Pt continues with severe weight loss: Add Ensure Enlive (honey thick) TID with meals  - Continue pureed/honey thick diet per SLP recommendation    NUTRITION ASSESSMENT:   Client Update: 80 yrs old Male with severe weight loss of 17% body weight x 3 months PTA continues with severe dysphagia per SLP. On & off oral diets since adm due to concerns for aspiration, most recently NPO 4/05-4/06 then started Pureed/HT diet 4/06 PM.  Was eating fairly well prior to NPO periods. FOOD/NUTRITION INTAKE   Diet Order:  Pureed Honey thick   Supplements: none   Food Allergies: NKFA  Average PO Intake:      Patient Vitals for the past 100 hrs:   % Diet Eaten   04/06/17 1200 0 %   04/06/17 0800 0 %   04/05/17 1925 0 %   04/05/17 1600 0 %   04/05/17 1200 50 %   04/05/17 0800 60 %   04/04/17 1925 100 %   04/04/17 1600 95 %   04/04/17 1200 65 %   04/04/17 0800 95 %   04/03/17 1900 100 %   04/03/17 1452 100 %   04/03/17 1106 100 %   04/03/17 0742 100 %   Pertinent Medications:  [x] Reviewed; lipitor, colace, lasix, abx, dilantin, aldactone, miralax, warfarin  Insulin:  [x]SSI  []Pre-meal   []Basal    []Drip  []None  Cultural/Cheondoism Food Preferences: None Identified ANTHROPOMETRICS  Height: 5' 9\" (175.3 cm)       Weight: 56.2 kg (124 lb)         BMI: 18.3 kg/m^2 underweight (Less than or = to 18.5% BMI)   Adm Weight: 132.5 lbs                Weight change: -8.5#    NUTRITION-FOCUSED PHYSICAL ASSESSMENT  Skin: intact, redness to sacrum      GI: last BM 4/06- loose    BIOCHEMICAL DATA & MEDICAL TESTS  Pertinent Labs:  [x] Reviewed; POC -259 mg/dl, BUN 38, Na 147      NUTRITION PRESCRIPTION  Calories: 7248-5622 kcal/day based on 35-40 kcal/kg  Protein: 72-90 g/day based on 1.2-1.5 g/kg  CHO: 263 g/day based on 50% of total energy  Fluid: 8903-7310 ml/day based on 1 kcal/ml      NUTRITION DIAGNOSES:   1.  Inadequate oral intake related to dysphagia as evidenced by NPO x2 days, ongoing weight loss -17% x 3 months   - ongoing    NUTRITION INTERVENTIONS:   INTERVENTIONS:        GOALS:  1. Enlive TID 1. >50% PO intake of meals/ONS, prevent further weight loss by next review 1-3 days     LEARNING NEEDS (Diet, Supplementation, Food/Nutrient-Drug Interaction):   [x] None Identified   [] Education provided/documented      Identified and patient: [] Declined   [] Was not appropriate/indicated        NUTRITION MONITORING /EVALUATION:   Follow PO intake  Monitor wt  Monitor for additional supplement needs     Previous Recommendations Implemented: yes        Previous Goals Met:  yes -progressing      [] Participated in Interdisciplinary Rounds    [x] Interdisciplinary Care Plan Reviewed  DISCHARGE NUTRITION RECOMMENDATIONS ADDRESSED:     [x] Yes- recommended texture per SLP, regular diet (see nutrition d/c instructions)     NUTRITION RISK:           [x] At risk                        [] Not currently at risk        Will follow-up per policy.   Rafy Storeyutant, RD  PAGER:  [phone]

## 2017-04-07 NOTE — PROGRESS NOTES
Hospitalist Progress Note-critical care note     Patient: Barby Silva MRN: [id number]  CSN: [id number]    YOB: 1933  Age: 80 y.o. Sex: male    DOA: 4/2/2017 LOS:  LOS: 5 days            Chief complaint: chf exacerbation, pulmonary fibrosis. A fib , aspiration pneumonia ,dysphagia     Assessment/Plan         Patient Active Problem List   Diagnosis Code    Chronic systolic heart failure ([facility]) I50.22    A-fib ([facility]) I48.91    Type II or unspecified type diabetes mellitus without mention of complication, not stated as uncontrolled E11.9    History of CVA (cerebrovascular accident) Z80.78    CKD (chronic kidney disease) N18.9    Seizure disorder ([address] G40.909    Dysphagia R13.10    CHF (congestive heart failure) ([facility]) I50.9    Acute respiratory failure with hypercapnia ([facility]) J96.02    Aspiration pneumonia ([address] J69.0    Pulmonary fibrosis ([address] J84.10    Acute pulmonary edema ([facility]) B65.3    Systolic CHF, acute ([address] I50.21    Constipation K59.00     1. CHF systolic acute. EF 45%. cardiology was on board,   Continue  lasix      2. Atrial fibrillation. On warfarin, rate controlled per coreg,inr daily , will hold warfarin due to inr 3.7    3. CKD. Continue monitor, stable     4. Hx CVA. Fall precaution     5. Seizure disorder. On iv dilantin, will switch to back to po according to swallowing testing     6. Hypothyroidism. Chronic thrombocytopenia. 7. Dysphagia. Dysphagia diet, aspiration precaution ,  barium swallowing test today, peg tube is one the option in the near future      8 aphagia   Due to the stroke     9 debility  10 hypotension  Resolved     11. Pulmonary fibrosis ,   Continue breathing tx. Continue  chest PT and mucinex     12 constipation    Continue colace/mirilax    13 aspiration pna  Continue levaquin day 2 and aspiration precaution. Subjective; sob better, Chest PT help     Wife   was at the bedside. All questions have been answered.  Discussed with her about the option of peg tube. She will think about it, she also is interested in rehab placement. 45 total min's spent on patient care including >50% on counseling/coordinating care. Discussed the above assessments. also discussed labs, medications and hospital course            Review of systems:    General: No fevers or chills. Cardiovascular: No chest pain or pressure. No palpitations. Pulmonary: No shortness of breath. Coughs better   Gastrointestinal: No nausea, vomiting. Vital signs/Intake and Output:  Visit Vitals    /47 (BP 1 Location: Right arm, BP Patient Position: At rest)    Pulse 97    Temp 98.6 °F (37 °C)    Resp 20    Ht 5' 9\" (1.753 m)    Wt 56.2 kg (124 lb)    SpO2 94%    BMI 18.31 kg/m2     Current Shift:  04/07 0701 - 04/07 1900  In: 240 [P.O.:240]  Out: -   Last three shifts:  04/05 1901 - 04/07 0700  In: 0   Out: 250 [Urine:250]    Physical Exam:  General: WD, WN. Alert, cooperative, no acute distress    HEENT: NC, Atraumatic. PERRLA, anicteric sclerae. Lungs: Mild rales, no wheezing   Heart:  Regular  rhythm,  + murmur, No Rubs, No Gallops  Abdomen: Soft, Non distended, Non tender.  +Bowel sounds,   Extremities: No c/c/e  Psych:   Not anxious or agitated. Neurologic:  No acute neurological deficit. Labs: Results:       Chemistry Recent Labs      04/07/17 0317 04/06/17 0141 04/05/17   0520   GLU  98  127*  118*   NA  147*  145  145   K  4.2  3.9  3.8   CL  105  103  104   CO2  36*  37*  34*   BUN  38*  24*  24*   CREA  1.51*  1.33*  1.24   CA  9.2  8.5  8.5   AGAP  6  5  7   BUCR  25*  18  19      CBC w/Diff Recent Labs      04/06/17 0141   HGB  13.0   HCT  40.1   PLT  105*      Cardiac Enzymes No results for input(s): CPK, CKND1, YI in the last 72 hours.     No lab exists for component: CKRMB, TROIP   Coagulation Recent Labs      04/07/17 0317 04/06/17 0141   PTP  32.6*  27.5*   INR  3.4*  2.7*       Lipid Panel Lab Results   Component Value Date/Time    Cholesterol, total 110 09/03/2013 02:01 AM    HDL Cholesterol 42 09/03/2013 02:01 AM    LDL, calculated 56.6 09/03/2013 02:01 AM    VLDL, calculated 11.4 09/03/2013 02:01 AM    Triglyceride 57 09/03/2013 02:01 AM    CHOL/HDL Ratio 2.6 09/03/2013 02:01 AM      BNP No results for input(s): BNPP in the last 72 hours. Liver Enzymes No results for input(s): TP, ALB, TBIL, AP, SGOT, GPT in the last 72 hours.     No lab exists for component: DBIL   Thyroid Studies Lab Results   Component Value Date/Time    TSH 1.75 01/09/2016 07:00 AM        Procedures/imaging: see electronic medical records for all procedures/Xrays and details which were not copied into this note but were reviewed prior to creation of Nilson Pedro MD

## 2017-04-07 NOTE — PROGRESS NOTES
Problem: Dysphagia (Adult)  Goal: *Acute Goals and Plan of Care (Insert Text)  Dysphagia Present: moderate  Aspiration: at risk     Recommendations:  Diet: puree, honey-thick liquids  Meds: in aplesauce  Aspiration Precautions  Other: small sips/bites, slow rate, alternate solids/liquids  1:1 supervision with meals    Goals: Patient will:  1. Tolerate PO trials with 0 s/s overt distress in 4/5 trials  2. Utilize compensatory swallow strategies/maneuvers (decrease bite/sip, size/rate, alt. liq/sol) with min cues in 4/5 trials  3. Complete an objective swallow study (i.e., MBSS) to assess swallow integrity, r/o aspiration, and determine of safest LRD, min A        Outcome: Progressing Towards Goal  SPEECH LANGUAGE PATHOLOGY DYSPHAGIA TREATMENT     Patient: Lenin Calloway (69 y.o. male)  Date: 4/7/2017  Diagnosis: Acute pulmonary edema (HCC) Systolic CHF, acute (HCC)       Precautions: Aspiration. Fall      ASSESSMENT:  Moderate oropharyngeal dysphagia. RN Timbo Flowers was in the room during dysphagia session. She presented the pills whole in applesauce; pt demo difficulties with transit of larger pills as noted by changes in facial expression and fatigue post swallow. Benefited from small bites, rest breaks, PO pills cleared 100%. Trialed honey thickened liquids via spoon and cup. Tolerated with 5 of 7 sips without weak cough. May benefit from larger bolus sizes (e.g., full teaspoon or controlled sips); will assess during MBS. SLP to f/u for diet tolerance, compensatory strategy training, and pt/family education.       Progression toward goals:  [X]         Improving appropriately and progressing toward goals  [ ]         Improving slowly and progressing toward goals  [ ]         Not making progress toward goals and plan of care will be adjusted       PLAN: Puree with Honey Thickened Liquids; WallStrip Protocol   Recommendations and Planned Interventions:  SLP to f/u for diet tolerance, compensatory strategy training, and pt/family education. Patient continues to benefit from skilled intervention to address the above impairments. Continue treatment per established plan of care. Discharge Recommendations:  Skilled Nursing Facility       SUBJECTIVE:   Patient stated . ..yes. OBJECTIVE:   Cognitive and Communication Status:  Neurologic State: Alert  Orientation Level: Oriented to person  Cognition: Follows commands  Perception: Appears intact  Perseveration: No perseveration noted  Safety/Judgement: Fall prevention  Dysphagia Treatment:  Oral Assessment:  Oral Assessment  Labial: Decreased rate, Right droop  Dentition: Intact, Natural  Oral Hygiene: good  Lingual: Decreased rate  Velum: No impairment  Mandible: No impairment  P.O. Trials:              Patient Position: 90 in chair              Vocal quality prior to P.O.: Low volume, Strain              Consistency Presented: Puree, Pill/Tablet, Honey thick liquid              How Presented: Self-fed/presented, SLP-fed/presented, Spoon, cup/sips              How Much:  (x5 puree; x7 HTL)              Bolus Acceptance: No impairment              Bolus Formation/Control: Impaired              Type of Impairment: Delayed, Mastication              Propulsion: Delayed (# of seconds), Discoordination              Oral Residue: None              Initiation of Swallow: Delayed (# of seconds)              Laryngeal Elevation: Decreased              Aspiration Signs/Symptoms: Clear throat, Weak cough              Pharyngeal Phase Characteristics: Altered vocal quality, Double swallow, Suspected pharyngeal residue, Audible swallow, Feeling of discomfort              Effective Modifications:  Alternate liquids/solids, Small sips and bites, Other (comment) (double swallow)              Cues for Modifications: Minimal                                Oral Phase Severity: Moderate              Pharyngeal Phase Severity : Moderate                                   PAIN:  Start of Tx:0  End of Tx: 0     After treatment:   [X]              Patient left in no apparent distress sitting up in chair  [ ]              Patient left in no apparent distress in bed  [ ]              Call bell left within reach  [X]              Nursing notified  [X]              Family present  [ ]              Caregiver present  [ ]              Bed alarm activated         COMMUNICATION/EDUCATION:   [X]        Aspiration precautions; swallow safety; compensatory techniques  [ ]        Patient unable to participate in education; education ongoing with staff  [ ]         Posted safety precautions in patient's room.   [ ]         Oral-motor/laryngeal strengthening exercises        JAYY Kline  Time Calculation: 18 mins

## 2017-04-07 NOTE — PROGRESS NOTES
Problem: Mobility Impaired (Adult and Pediatric)  Goal: *Acute Goals and Plan of Care (Insert Text)  Physical Therapy Goals  Initiated 4/5/2017 and to be accomplished within 2-8 day(s)  1. Patient will move from supine to sit and sit to supine in bed with supervision/CGA. 2. Patient will transfer from bed to chair and chair to bed with supervision/CGA using the least restrictive device. 3. Patient will perform sit to stand with supervision/CGA. 4. Patient will ambulate with supervision/CGA for 150 feet with the least restrictive device. 5. Patient will ascend/descend 4 stairs with use of handrail(s) with minimal assistance/contact guard assist.   Outcome: Not Progressing Towards Goal  PHYSICAL THERAPY TREATMENT     Patient: Chayo Grimes (40 y.o. male)  Date: 4/7/2017  Diagnosis: Acute pulmonary edema (HCC) Systolic CHF, acute ([facility]       Precautions: Fall   Chart, physical therapy assessment, plan of care and goals were reviewed. ASSESSMENT:  Pt presents with poor respiratory sound and coughing. Wife noting increased coughing during and after lunch. Pt with 93% SPO2 on 3L NC, but drastically drops to 70% with ambulation. Pt rapid fatigued, requiring seated rest in moore to recover SPO2 to 90% with NC increase to 6L for 5 min. Pt able to amb back to bedside, with 6L and return to bed after another 5 min. Pt has excessive nasal mucus and saliva production with ambulation. Cough slightly productive, but respirations still poor. RN immediately informed of pt's current situation and session outcome. Progression toward goals:  [ ]      Improving appropriately and progressing toward goals  [X]      Improving slowly and progressing toward goals  [ ]      Not making progress toward goals and plan of care will be adjusted       PLAN:  Patient continues to benefit from skilled intervention to address the above impairments. Continue treatment per established plan of care.   Discharge Recommendations:  SNF or hospice   Further Equipment Recommendations for Discharge:  bedside commode, portable oxygen and rolling walker       SUBJECTIVE:   Patient stated Ok.       OBJECTIVE DATA SUMMARY:   Critical Behavior:  Neurologic State: Alert  Orientation Level: Oriented to person  Cognition: Decreased command following, Appropriate for age attention/concentration  Safety/Judgement: Fall prevention  Functional Mobility Training:  Bed Mobility:  Rolling: Minimum assistance  Supine to Sit: Minimum assistance  Sit to Supine: Minimum assistance  Transfers:  Sit to Stand: Minimum assistance  Stand to Sit: Minimum assistance  Balance:  Sitting: Impaired   Standing: Impaired  Standing - Static: Good  Standing - Dynamic : Fair  Ambulation/Gait Training:  Distance (ft): 60 Feet (ft)  Assistive Device: Gait belt;Walker, rolling  Ambulation - Level of Assistance: Contact guard assistance  Gait Abnormalities: Decreased step clearance;Shuffling gait  Base of Support: Narrowed  Stance: Time  Speed/Aurelia: Slow  Step Length: Right shortened;Left shortened  Swing Pattern: Right asymmetrical;Left asymmetrical  Pain:  Pain Scale 1: Numeric (0 - 10)  Pain Intensity 1: 0  Activity Tolerance:   Good  Please refer to the flowsheet for vital signs taken during this treatment.   After treatment:   [ ] Patient left in no apparent distress sitting up in chair  [X] Patient left in no apparent distress in bed  [X] Call bell left within reach  [X] Nursing notified  [ ] Caregiver present  [ ] Bed alarm activated      Khoa Alejo PTA   Time Calculation: 30 mins

## 2017-04-07 NOTE — PROGRESS NOTES
Pharmacy Dosing Services: Warfarin    Consult for Warfarin Dosing by Pharmacy by Dr. Bairon Ashton provided for this 80 y.o.  male , for indication of Atrial Fibrillation. Day of Therapy: Continued from home  Dose to achieve an INR goal of 2-3    Order entered to HOLD Warfarin today at 18:00. Significant drug interactions: levofloxacin (started 4/7)    LABS    PT/INR Lab Results   Component Value Date/Time    INR 3.4 04/07/2017 03:17 AM      Platelets Lab Results   Component Value Date/Time    PLATELET 647 35/12/0049 01:41 AM      H/H     Lab Results   Component Value Date/Time    HGB 13.0 04/06/2017 01:41 AM        Warfarin Administrations (last 168 hours)       Date/Time Action Medication Dose      04/06/17 1803 Given    warfarin (COUMADIN) tablet 3 mg 3 mg    04/04/17 1956 Given    warfarin (COUMADIN) tablet 4 mg 4 mg    04/03/17 1743 Given    warfarin (COUMADIN) tablet 4 mg 4 mg    04/02/17 1726 Given    warfarin (COUMADIN) tablet 4 mg 4 mg              Pharmacy to follow daily and will provide subsequent Warfarin dosing based on clinical status.   CRISTINA Myles)  Contact information [phone]

## 2017-04-07 NOTE — PROGRESS NOTES
0730 Assumed care of patient from [address] [place] [address] TIA Rojas Form. 1130 Patient up to chair, wife at the bedside, call bell within reach. 1600 Patient returned from ay for MBS, alert, appears restless, dyspnea at rest, lung sounds very coarse, O2 sats 86-89% on 2 L NC. Increased NC to 6 L O2 sats remain 88%. Called respiratory for breathing tx.    1606 Duo-neb administered prn.    1615 Post neb tx O2 sats 93-95 % on 6 L NC    1620 Respiratory therapist at the bedside, NT suctioning in progress. Cough frequent non-productive weak cough observed, coarse upper air , appears to be in  respiratory distress. 36 Dr Johnnie Bhat update with patient's current status since patient returned from [facility]. Patient will be NPO including medications, start D5 at 25 ml/hr for nutrition, Coreg held prn metoprolol available. 1702 Metoprolol 0.25 mg iv push prn given for -115.    1714 Patient resting with eyes closed, appears more calm, O2 sats 94% on 5L NC, lungs remain coarse after NT suctioning. Moderate amount of creamy white substance suctioned via NT. Wife at the bedside, updated on patient's current status, will continue to monitor.

## 2017-04-07 NOTE — PROGRESS NOTES
Palliative Medicine Progress Note  DR. VERAS[facility]S [facility]: [phone])  [facility]: [phone]   [facility]: [phone]    Patient Name: Jourdan Ford  YOB: 1933    Date of Initial Consult: 4/5/17  Reason for Consult: care decisions  Requesting Provider: Dr. Sudarshan Cobos   Primary Care Physician: Suzette Sheffield MD      SUMMARY:   Jourdan Ford is a 80y.o. 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 Walking w/ cane and feeding self at home, though wife says he never completely got over last hospitalization. Prior to then had had about four hospitalizations for aspiration since major stroke in 2008. No other hospitalization in last year. Has lost significant wt in last six months. 4/6/17: Feels well, worked w/ PT yesterday. NPO due to results of MBS. It seems his dysphagia is worsening. 4/7/17: Worked w/ SLP yesterday who feels he only needs careful feeding w/ modified consistency of oral intake. He feels well, denies complaints. PALLIATIVE DIAGNOSES:     Patient Active Problem List   Diagnosis Code    Chronic systolic heart failure ([facility]) I50.22    A-fib ([facility]) I48.91    Type II or unspecified type diabetes mellitus without mention of complication, not stated as uncontrolled E11.9    History of CVA (cerebrovascular accident) Z80.78    CKD (chronic kidney disease) N18.9    Seizure disorder ([address] G40.909    Dysphagia R13.10    CHF (congestive heart failure) ([facility]) I50.9    Acute respiratory failure with hypercapnia ([facility]) J96.02    Aspiration pneumonia ([address] J69.0    Pulmonary fibrosis ([address] J84.10    Acute pulmonary edema ([facility]) R99.6    Systolic CHF, acute ([address] I50.21    Constipation K59.00     1. PLAN:   1. Met with patient and his wife.  He was largely non verbal. Reviewed clinical course and possibility that rapid readmission for aspiration may be a marker for worsening aspiration and general decline. Has AMD and DDNR on chart. She is not certain if he will be willing to discharge to SNF rather than home with PT if it is recommended. May derive only marginal benefit. Should he go home and certainly in the longer run she will need additional personal home care. Admission to SNF carries risk of hospital readmission. He denies current sxs. Questions answered. 4/6/17: Will likely have to choose whether to have PEG placed. Discussed the pros and cons of proceeding. PEG will not eliminate risk of aspiration, but probably help him maintain body weight. Could opt for tube and continue to have comfort feedings orally as long as they accept the risk. Questions answered. 4/7/17: Not clear he is going to thrive relying solely on po intake, but at least in theory he can continue to manage for awhile and doesn't need to consider PEG. Questions answered. 2. Initial consult note routed to primary continuity provider  3. Communicated plan of care with: Palliative IDT       GOALS OF CARE:   Curative w/ limitations    Advance Care Planning 4/5/2017   Patient's Healthcare Decision Maker is: Named in scanned ACP document   Primary Decision Maker Name Jennifer Pineda   Primary Decision Maker Phone Number [phone]   Primary Decision Maker Relationship to Patient Spouse   Confirm Advance Directive Yes, on file   Does the patient have other document types Do Not Resuscitate; Power of [employer]; Other (comment)           HISTORY:     History obtained from: patient and wife    CHIEF COMPLAINT: see summary    HPI/SUBJECTIVE:    The patient is:   [] Verbal and participatory  [x] Non-participatory due to:   Minimal verbal response     Clinical Pain Assessment (nonverbal scale for nonverbal patients):    denies       FUNCTIONAL ASSESSMENT:     Palliative Performance Scale (PPS): 40          PSYCHOSOCIAL/SPIRITUAL SCREENING:     Advance Care Planning:  Advance Care Planning 4/5/2017   Patient's Healthcare Decision Maker is: Named in scanned ACP document   Primary Decision Maker Name Finn Ceja   Primary Decision Maker Phone Number [phone]   Primary Decision Maker Relationship to Patient Spouse   Confirm Advance Directive Yes, on file   Does the patient have other document types Do Not Resuscitate; Power of [employer]; Other (comment)        Any spiritual / Confucianist concerns:  [] Yes /  [x] No    Caregiver Burnout:  [] Yes /  [x] No /  [] No Caregiver Present      Anticipatory grief assessment:   [] Normal  / [] Maladaptive       ESAS Anxiety:      ESAS Depression:          REVIEW OF SYSTEMS:     Positive and pertinent negative findings in ROS are noted above in HPI. The following systems were [] reviewed / [x] unable to be reviewed as noted in HPI  Other findings are noted below. Systems: constitutional, ears/nose/mouth/throat, respiratory, gastrointestinal, genitourinary, musculoskeletal, integumentary, neurologic, psychiatric, endocrine. Positive findings noted below. Modified ESAS Completed by: provider                                   Stool Occurrence(s): 1        PHYSICAL EXAM:     Wt Readings from Last 3 Encounters:   04/07/17 56.2 kg (124 lb)   01/03/17 65.2 kg (143 lb 12.8 oz)   01/12/16 69.9 kg (154 lb)     Blood pressure 112/47, pulse 97, temperature 98.6 °F (37 °C), resp. rate 20, height 5' 9\" (1.753 m), weight 56.2 kg (124 lb), SpO2 94 %. Pain:  Pain Scale 1: Numeric (0 - 10)  Pain Intensity 1: 0                 Last bowel movement: yesterday    Constitutional: frail, NAD  Eyes: pupils equal, anicteric  ENMT: no nasal discharge, moist mucous membranes  Cardiovascular: regular rhythm, distal pulses intact  Respiratory: breathing not labored, symmetric  Gastrointestinal: soft non-tender, +bowel sounds  Musculoskeletal: no deformity, no tenderness to palpation  Skin: warm, dry  Neurologic: following commands, moving all extremities.  R hemiparesis\  Psychiatric: full affect, no hallucinations  Other:       HISTORY:     Principal Problem:    Systolic CHF, acute ([facility] (4/3/2017)    Active Problems:    A-fib ([address] (12/3/2012)      History of CVA (cerebrovascular accident) (12/4/2012)      CKD (chronic kidney disease) (12/4/2012)      Seizure disorder ([address] (9/2/2013)      Dysphagia (1/12/2016)      Acute pulmonary edema ([address] (4/2/2017)      Constipation (4/5/2017)      Past Medical History:   Diagnosis Date    Atrial fibrillation ([facility]     CAD (coronary artery disease)     Heart failure (HCC)     High cholesterol     Hypertension     Kidney stone     Seizure ([facility]     Stroke [facility])       Past Surgical History:   Procedure Laterality Date    CARDIAC SURG PROCEDURE UNLIST      aneurysm repair    HX UROLOGICAL      kidney stone removed      History reviewed. No pertinent family history. History reviewed, no pertinent family history.   Social History   Substance Use Topics    Smoking status: Former Smoker    Smokeless tobacco: Not on file    Alcohol use No     No Known Allergies   Current Facility-Administered Medications   Medication Dose Route Frequency    insulin lispro (HUMALOG) injection   SubCUTAneous AC&HS    glucose chewable tablet 16 g  16 g Oral PRN    glucagon (GLUCAGEN) injection 1 mg  1 mg IntraMUSCular PRN    dextrose (D50W) injection syrg 12.5-25 g  25-50 mL IntraVENous PRN    polyethylene glycol (MIRALAX) packet 17 g  17 g Oral BID    docusate sodium (COLACE) capsule 100 mg  100 mg Oral DAILY    guaiFENesin ER (MUCINEX) tablet 600 mg  600 mg Oral Q12H    metoprolol (LOPRESSOR) injection 1.25 mg  1.25 mg IntraVENous Q6H PRN    furosemide (LASIX) injection 40 mg  40 mg IntraVENous BID    phenytoin (DILANTIN) injection 100 mg  100 mg IntraVENous Q8H    digoxin (LANOXIN) injection 100 mcg  100 mcg IntraVENous DAILY    levoFLOXacin (LEVAQUIN) 500 mg in D5W IVPB  500 mg IntraVENous Q24H    valsartan (DIOVAN) tablet 20 mg  20 mg Oral DAILY    budesonide (PULMICORT) 500 mcg/2 ml nebulizer suspension  500 mcg Nebulization BID RT    albuterol-ipratropium (DUO-NEB) 2.5 MG-0.5 MG/3 ML  3 mL Nebulization Q4H PRN    atorvastatin (LIPITOR) tablet 40 mg  40 mg Oral DAILY    carvedilol (COREG) tablet 3.125 mg  3.125 mg Oral BID WITH MEALS    levothyroxine (SYNTHROID) tablet 50 mcg  50 mcg Oral ACB    spironolactone (ALDACTONE) tablet 25 mg  25 mg Oral DAILY    acetaminophen (TYLENOL) tablet 650 mg  650 mg Oral Q4H PRN    ondansetron (ZOFRAN) injection 4 mg  4 mg IntraVENous Q4H PRN    Warfarin - Pharmacy to Dose  1 Each Other Rx Dosing/Monitoring        LAB AND IMAGING FINDINGS:     Lab Results   Component Value Date/Time    WBC 13.2 04/04/2017 05:33 AM    HGB 13.0 04/06/2017 01:41 AM    PLATELET 433 68/65/3257 01:41 AM     Lab Results   Component Value Date/Time    Sodium 147 04/07/2017 03:17 AM    Potassium 4.2 04/07/2017 03:17 AM    Chloride 105 04/07/2017 03:17 AM    CO2 36 04/07/2017 03:17 AM    BUN 38 04/07/2017 03:17 AM    Creatinine 1.51 04/07/2017 03:17 AM    Calcium 9.2 04/07/2017 03:17 AM    Magnesium 2.0 04/07/2017 03:17 AM    Phosphorus 2.8 12/03/2009 03:28 AM      Lab Results   Component Value Date/Time    AST (SGOT) 21 04/02/2017 03:40 AM    Alk.  phosphatase 71 04/02/2017 03:40 AM    Protein, total 7.1 04/02/2017 03:40 AM    Albumin 2.8 04/02/2017 03:40 AM    Globulin 4.3 04/02/2017 03:40 AM     Lab Results   Component Value Date/Time    INR 3.4 04/07/2017 03:17 AM    Prothrombin time 32.6 04/07/2017 03:17 AM    aPTT 51.0 04/02/2017 03:40 AM      No results found for: IRON, FE, TIBC, IBCT, PSAT, FERR   No results found for: PH, PCO2, PO2  No components found for: [place]   Lab Results   Component Value Date/Time    CK 81 04/03/2017 07:25 PM    CK - MB <1.0 04/03/2017 07:25 PM              Total time: 15 min  Counseling / coordination time: 15  > 50% counseling / coordination      Mirian Newell MD  Palliative Medicine

## 2017-04-08 NOTE — PROGRESS NOTES
Shift Summary:  Pt had an uneventful night. No distress noted. Pts bed alarm remained intact throughout the night. New IV started . Head of bed up >30 degrees. 0730: Bedside and Verbal shift change report given to Matthew HEMPHILL (oncoming nurse) by Alexandra Enciso RN   (offgoing nurse). Report included the following information SBAR, Kardex and MAR.

## 2017-04-08 NOTE — ROUTINE PROCESS
Bedside and Verbal shift change report given to SARMAD Lora (oncoming nurse) by Quincy (offgoing nurse). Report included the following information SBAR, Kardex, Intake/Output, MAR and Recent Results.

## 2017-04-08 NOTE — PROGRESS NOTES
Hospitalist Progress Note    Patient: Seth Jennings MRN: [id number]  CSN: [id number]    YOB: 1933  Age: 80 y.o. Sex: male    DOA: 4/2/2017 LOS:  LOS: 6 days                Assessment and Plan:    Principal Problem:    Systolic CHF, acute ([address] (4/3/2017)    Active Problems:    A-fib ([address] (12/3/2012)      History of CVA (cerebrovascular accident) (12/4/2012)      CKD (chronic kidney disease) (12/4/2012)      Seizure disorder ([address] (9/2/2013)      Dysphagia (1/12/2016)      Acute pulmonary edema ([address] (4/2/2017)      Constipation (4/5/2017)        1. CHF systolic acute. EF 45%. cardiology was on board, Continue lasix       2. Atrial fibrillation. On warfarin, rate controlled per coreg,inr daily , will hold warfarin due to inr 3.7     3. CKD. Continue monitor, stable      4. Hx CVA. He is having dysphagia and limitations because of this      5. Seizure disorder.: On iv dilantin, will switch to back to po according to swallowing testing      6. Hypothyroidism. Chronic thrombocytopenia.     7. Dysphagia. Dysphagia diet, aspiration precaution , barium swallowing test done.  ,  Notes from Speech pathologist appreciated  . Will switch back to po for meds . We had long discussion with family about PEG tube and the risks of this as well as the rather limited benefits. I have recommended against. They will discuss and decide.        8 hypotension: Resolved      9. Pulmonary fibrosis ,   Continue breathing tx. Continue chest PT and mucinex      10 constipation: Continue colace/mirilax     11 aspiration pna  Continue levaquin day 3  and aspiration precaution. Change to po    DC planning for SNF discharge       Chief complaint:  Shortness of breath         Subjective:  Some aphasia present. He is comfortable. Long discussionw ith wife and daughter      Review of systems:    General: No fevers or chills. Cardiovascular: No chest pain or pressure. No palpitations. Pulmonary: No shortness of breath. Gastrointestinal: No nausea, vomiting. Objective:    Vital signs/Intake and Output:  Visit Vitals    /57 (BP 1 Location: Left arm, BP Patient Position: At rest;Supine; Head of bed elevated (Comment degrees))    Pulse 87    Temp 98.1 °F (36.7 °C)    Resp 14    Ht 5' 9\" (1.753 m)    Wt 58.6 kg (129 lb 3 oz)    SpO2 98%    BMI 19.08 kg/m2     Current Shift:  04/08 0701 - 04/08 1900  In: 375.4 [I.V.:375.4]  Out: -   Last three shifts:  04/06 1901 - 04/08 0700  In: 240 [P.O.:240]  Out: -     Physical Exam:  General: NAD, AAOx3. Non-toxic. HEENT: NC/AT. PERRLA, EOMI.  MMM. Lungs: Nml inspection. CTA B/L. No wheezing, rales or rhonchi. Heart:  S1S2 RRR,  PMI mid 5th IC space. No M/RG. Abdomen: Soft, NT/ND.  BS+. No peritoneal signs. Extremities: No C/C/E. Psych:   Nml affect. Neurologic:  2-12 intact. Strength 5/5 throughout. Sensation symmetrical.          Labs: Results:       Chemistry Recent Labs      04/07/17 0317 04/06/17 0141   GLU  98  127*   NA  147*  145   K  4.2  3.9   CL  105  103   CO2  36*  37*   BUN  38*  24*   CREA  1.51*  1.33*   CA  9.2  8.5   AGAP  6  5   BUCR  25*  18      CBC w/Diff Recent Labs      04/06/17 0141   HGB  13.0   HCT  40.1   PLT  105*      Cardiac Enzymes No results for input(s): CPK, CKND1, YI in the last 72 hours. No lab exists for component: CKRMB, TROIP   Coagulation Recent Labs      04/08/17   0526 04/07/17 0317   PTP  29.6*  32.6*   INR  3.0*  3.4*       Lipid Panel Lab Results   Component Value Date/Time    Cholesterol, total 110 09/03/2013 02:01 AM    HDL Cholesterol 42 09/03/2013 02:01 AM    LDL, calculated 56.6 09/03/2013 02:01 AM    VLDL, calculated 11.4 09/03/2013 02:01 AM    Triglyceride 57 09/03/2013 02:01 AM    CHOL/HDL Ratio 2.6 09/03/2013 02:01 AM      BNP No results for input(s): BNPP in the last 72 hours. Liver Enzymes No results for input(s): TP, ALB, TBIL, AP, SGOT, GPT in the last 72 hours.     No lab exists for component: DBIL   Thyroid Studies Lab Results   Component Value Date/Time    TSH 1.75 01/09/2016 07:00 AM        Procedures/imaging: see electronic medical records for all procedures/Xrays and details which were not copied into this note but were reviewed prior to creation of Plan

## 2017-04-08 NOTE — PROGRESS NOTES
Pharmacy Dosing Services: Warfarin    Consult for Warfarin Dosing by Pharmacy by Dr. Dorrie Goodell provided for this 80 y.o.  male , for indication of Atrial Fibrillation. Dose to achieve an INR goal of 2-3    Order entered for Warfarin 1 mg ordered to be given today at 18:00. Significant drug interactions: levofloxacin (started 4/7)    LABS    PT/INR Lab Results   Component Value Date/Time    INR 3.0 04/08/2017 05:26 AM      Platelets Lab Results   Component Value Date/Time    PLATELET 715 83/02/6869 01:41 AM      H/H     Lab Results   Component Value Date/Time    HGB 13.0 04/06/2017 01:41 AM        Warfarin Administrations (last 168 hours)       Date/Time Action Medication Dose      04/06/17 1803 Given    warfarin (COUMADIN) tablet 3 mg 3 mg    04/04/17 1956 Given    warfarin (COUMADIN) tablet 4 mg 4 mg    04/03/17 1743 Given    warfarin (COUMADIN) tablet 4 mg 4 mg    04/02/17 1726 Given    warfarin (COUMADIN) tablet 4 mg 4 mg              Pharmacy to follow daily and will provide subsequent Warfarin dosing based on clinical status.   CRISTINA Oakley)  Contact information [phone]

## 2017-04-08 NOTE — ROUTINE PROCESS
Bedside and Verbal shift change report given to RAJ Bobby (oncoming nurse) by Quincy (offgoing nurse). Report included the following information SBAR, Kardex, Intake/Output, MAR, Recent Results and Cardiac Rhythm Afib BBB. Patient resting with eyes closed, no signs of distress, continue to monitor.

## 2017-04-08 NOTE — PROGRESS NOTES
Shift summary:    Patient sleeping between care today, no signs of respiratory distress, NPO per med order. All oral medications held. Family to make care decision regarding nutritional status, patient high risk for aspiration, MBS completed on 4/7/17, patient returned to unit having respiratory distress requiring NT suction, with moderate amount of white chalky content obtained. Patient up to chair for approx 30 mins today, generalized weakness but able to transfer from bed to chair. Patient requested to return to bed. Family remained at the bedside throughout the day. Family deciding on possible PEG tube placement. Lungs remain coarse, 3-5 L NC has been required to maintain sats between 93-96%.

## 2017-04-08 NOTE — PROGRESS NOTES
Problem: Mobility Impaired (Adult and Pediatric)  Goal: *Acute Goals and Plan of Care (Insert Text)  Physical Therapy Goals  Initiated 4/5/2017 and to be accomplished within 2-8 day(s)  1. Patient will move from supine to sit and sit to supine in bed with supervision/CGA. 2. Patient will transfer from bed to chair and chair to bed with supervision/CGA using the least restrictive device. 3. Patient will perform sit to stand with supervision/CGA. 4. Patient will ambulate with supervision/CGA for 150 feet with the least restrictive device. 5. Patient will ascend/descend 4 stairs with use of handrail(s) with minimal assistance/contact guard assist.   Outcome: Progressing Towards Goal  PHYSICAL THERAPY TREATMENT     Patient: Barber Vicente (80 y.o. male)  Date: 4/8/2017  Diagnosis: Acute pulmonary edema (HCC) Systolic CHF, acute (HCC)       Precautions: Fall   Chart, physical therapy assessment, plan of care and goals were reviewed. ASSESSMENT:  Pt is able to transition to EOB  With assistance. Poor SPO2 again today, with drop from 96 to 79% on 4L NC. Increased to 92% (2 min) with increase to 5L NC. Pt stands for 20 second, before requesting to sit. Found to have 80% SPO2 (3 min recovery) . Pt stood again, but note that he doesn't want to walk. Pt agreeable to sitting in chair. Pt stable at 88% SPO2 on 5L NC in chair. KANCHAN Castro informed on session outcome and current situation, agreeing with 5L NC. Progression toward goals:  [ ]      Improving appropriately and progressing toward goals  [X]      Improving slowly and progressing toward goals  [ ]      Not making progress toward goals and plan of care will be adjusted       PLAN:  Patient continues to benefit from skilled intervention to address the above impairments. Continue treatment per established plan of care.   Discharge Recommendations:  SNF or Hospice   Further Equipment Recommendations for Discharge:  bedside commode and rolling walker SUBJECTIVE:   Patient stated Ok.       OBJECTIVE DATA SUMMARY:   Critical Behavior:  Neurologic State: Alert  Orientation Level: Disoriented to situation, Disoriented to time, Oriented to person, Oriented to place  Cognition: Follows commands  Safety/Judgement: Fall prevention  Functional Mobility Training:  Bed Mobility:  Rolling: Moderate assistance  Supine to Sit: Moderate assistance  Transfers:  Sit to Stand: Supervision  Stand to Sit: Supervision  Balance:  Sitting: Intact  Standing: Impaired  Standing - Static: Good  Standing - Dynamic : Fair  Ambulation/Gait Training:  Distance (ft): 3 Feet (ft)  Assistive Device: Gait belt;Walker, rolling  Ambulation - Level of Assistance: Contact guard assistance  Gait Abnormalities: Decreased step clearance;Shuffling gait  Base of Support: Narrowed  Stance: Time  Speed/Aurelia: Slow  Step Length: Right shortened;Left shortened  Swing Pattern: Right asymmetrical;Left asymmetrical  Pain:  Pain Scale 1: Numeric (0 - 10)  Pain Intensity 1: 0  Activity Tolerance:   Good  Please refer to the flowsheet for vital signs taken during this treatment.   After treatment:   [X] Patient left in no apparent distress sitting up in chair  [ ] Patient left in no apparent distress in bed  [X] Call bell left within reach  [X] Nursing notified  [ ] Caregiver present  [ ] Bed alarm activated      Aden Proctor PTA   Time Calculation: 31 mins

## 2017-04-09 NOTE — PROGRESS NOTES
Pharmacy Dosing Services: Warfarin    Consult for Warfarin Dosing by Pharmacy by Dr. Gayla Fernandez provided for this 80 y.o.  male , for indication of Atrial Fibrillation. Dose to achieve an INR goal of 2-3    Order entered to HOLD Warfarin today at 18:00.   (Warfarin 1mg held yesterday as patient was NPO.)    Significant drug interactions: levofloxacin (started 4/7)    LABS    PT/INR Lab Results   Component Value Date/Time    INR 3.7 04/09/2017 03:32 AM      Platelets Lab Results   Component Value Date/Time    PLATELET 568 18/20/3643 03:32 AM      H/H     Lab Results   Component Value Date/Time    HGB 13.0 04/09/2017 03:32 AM        Warfarin Administrations (last 168 hours)       Date/Time Action Medication Dose      04/06/17 1803 Given    warfarin (COUMADIN) tablet 3 mg 3 mg    04/04/17 1956 Given    warfarin (COUMADIN) tablet 4 mg 4 mg    04/03/17 1743 Given    warfarin (COUMADIN) tablet 4 mg 4 mg    04/02/17 1726 Given    warfarin (COUMADIN) tablet 4 mg 4 mg              Pharmacy to follow daily and will provide subsequent Warfarin dosing based on clinical status.   CRISTINA Castro)  Contact information [phone]

## 2017-04-09 NOTE — PROGRESS NOTES
1925: Assumed patient care, he was alert and oriented to person, but disorientated to place, time and situation. Vital signs were stable. MEWS score was a two. Respiratory status was stable on 3L via nasal cannula. Patient denied any pain, discomfort, nausea, vomiting, dizziness or anxiety. FLACC score suggested that he was comfortable. White board was updated and explained. Bed was locked and in lowest position. Call bell and personal belongings were within reach. Patient was on aspiration precautions and NPO for safety. He had no questions, comments, or concerns after bedside shift report. 0700: Patient had an uneventful shift. Respiratory status, vital signs and MEWS score remained stable. Patient had no questions, comments or concerns after bedside shift report.

## 2017-04-09 NOTE — PROGRESS NOTES
Problem: Mobility Impaired (Adult and Pediatric)  Goal: *Acute Goals and Plan of Care (Insert Text)  Physical Therapy Goals  Initiated 4/5/2017 and to be accomplished within 2-8 day(s)  1. Patient will move from supine to sit and sit to supine in bed with supervision/CGA. 2. Patient will transfer from bed to chair and chair to bed with supervision/CGA using the least restrictive device. 3. Patient will perform sit to stand with supervision/CGA. 4. Patient will ambulate with supervision/CGA for 150 feet with the least restrictive device. 5. Patient will ascend/descend 4 stairs with use of handrail(s) with minimal assistance/contact guard assist.   Outcome: Progressing Towards Goal  PHYSICAL THERAPY TREATMENT     Patient: José Kwan (54 y.o. male)  Date: 4/9/2017  Diagnosis: Acute pulmonary edema (HCC) Systolic CHF, acute (HCC)       Precautions: Fall   Chart, physical therapy assessment, plan of care and goals were reviewed. ASSESSMENT:  Pt presents with wet respirations. Pt's family noting that pt was in chair for 30 min earlier. Pt able to complete 2 ambulation trials into moore. SPO2 range was 83-92% on 6L NC with cuing for resp sequence. Pt with moderate fatigue at end of session, requesting to return to bed    Progression toward goals:  [ ]      Improving appropriately and progressing toward goals  [X]      Improving slowly and progressing toward goals  [ ]      Not making progress toward goals and plan of care will be adjusted       PLAN:  Patient continues to benefit from skilled intervention to address the above impairments. Continue treatment per established plan of care. Discharge Recommendations:  [facility] or hospice  Further Equipment Recommendations for Discharge:  rolling walker and O2       SUBJECTIVE:   Patient stated Ok.       OBJECTIVE DATA SUMMARY:   Critical Behavior:  Neurologic State: Alert  Orientation Level: Oriented to person, Oriented to place, Disoriented to situation, Disoriented to time  Cognition: Follows commands  Safety/Judgement: Fall prevention  Functional Mobility Training:  Bed Mobility:  Rolling: Minimum assistance  Supine to Sit: Minimum assistance  Sit to Supine: Contact guard assistance  Transfers:  Sit to Stand: Stand-by asssistance;Contact guard assistance  Stand to Sit: Stand-by asssistance;Contact guard assistance  Balance:  Sitting: Intact  Standing: Impaired; With support  Standing - Static: Good  Standing - Dynamic : Fair  Ambulation/Gait Training:  Distance (ft): 70 Feet (ft) (35 x 2)  Assistive Device: Gait belt;Walker, rolling  Ambulation - Level of Assistance: Contact guard assistance  Gait Abnormalities: Decreased step clearance;Shuffling gait  Base of Support: Widened  Speed/Aurelia: Slow  Step Length: Right shortened;Left shortened  Activity Tolerance:   Fair  Please refer to the flowsheet for vital signs taken during this treatment.   After treatment:   [ ] Patient left in no apparent distress sitting up in chair  [X] Patient left in no apparent distress in bed  [X] Call bell left within reach  [X] Nursing notified  [X] Caregiver present  [ ] Bed alarm activated      Eric Juarez PTA   Time Calculation: 42 mins

## 2017-04-09 NOTE — PROGRESS NOTES
Hospitalist Progress Note    Patient: Karl Soria MRN: [id number]  [facility]: [id number]    YOB: 1933  Age: 80 y.o. Sex: male    DOA: 4/2/2017 LOS:  LOS: 7 days                Assessment and Plan:    Principal Problem:    Systolic CHF, acute ([address] (4/3/2017)    Active Problems:    A-fib ([address] (12/3/2012)      History of CVA (cerebrovascular accident) (12/4/2012)      CKD (chronic kidney disease) (12/4/2012)      Seizure disorder ([address] (9/2/2013)      Dysphagia (1/12/2016)      Acute pulmonary edema ([address] (4/2/2017)      Constipation (4/5/2017)        1. CHF systolic acute. EF 45%. On spironolactone, ARB, digoxin      2. Atrial fibrillation.: rate control  and anticoagulation. Pharmacy dosing for us         3. CKD. :stable        4. Hx CVA. : He is having dysphagia and limitations because of this       5. Seizure disorder.: On iv dilantin, will switch to back to po according to swallowing testing       6. Hypothyroidism. 7. Chronic thrombocytopenia.      7. Dysphagia. Dysphagia diet, aspiration precaution , barium swallowing test done. , Notes from Speech pathologist appreciated . Will switch back to po for meds . We had long discussion with family about PEG tube and the risks of this as well as the rather limited benefits. I have recommended against. They will discuss and decide.         8 hypotension: Resolved       9. Pulmonary fibrosis ,   Continue breathing tx. Continue chest PT and mucinex       10 constipation: Continue colace/mirilax      11 aspiration pna  Continue levaquin day 4  and aspiration precaution. Change to po     DC planning for SNF discharge       Chief complaint:  Shortness of breath       Subjective:    He is alert today. Family decided not to do PEG tube. He said he is ready to go if that is what happens but he does not want invasive things done to him. Breathing is pretty good right now       Review of systems:    General: No fevers or chills.   Cardiovascular: No chest pain or pressure. No palpitations. Pulmonary: No shortness of breath. Gastrointestinal: No nausea, vomiting. Objective:    Vital signs/Intake and Output:  Visit Vitals    /56 (BP 1 Location: Left arm, BP Patient Position: At rest;Supine; Head of bed elevated (Comment degrees))    Pulse 71    Temp 97.7 °F (36.5 °C)    Resp 16    Ht 5' 9\" (1.753 m)    Wt 55 kg (121 lb 3.2 oz)    SpO2 98%    BMI 17.9 kg/m2     Current Shift:     Last three shifts:  04/07 1901 - 04/09 0700  In: 711.7 [I.V.:711.7]  Out: 0     Physical Exam:  General: Frail, elderly male in no distress   HEENT: NC/AT. PERRLA, EOMI.  MMM. Lungs: Nml inspection. CTA B/L. No wheezing, rales or rhonchi. Heart:  S1S2 RRR,  PMI mid 5th IC space. No M/RG. Abdomen: Soft, NT/ND.  BS+. No peritoneal signs. Extremities: No C/C/E. Psych:   Nml affect. Neurologic:  2-12 intact. Strength 5/5 throughout. Sensation symmetrical.          Labs: Results:       Chemistry Recent Labs      04/09/17 0332 04/07/17 0317   GLU  158*  98   NA  148*  147*   K  4.1  4.2   CL  106  105   CO2  35*  36*   BUN  58*  38*   CREA  1.49*  1.51*   CA  9.5  9.2   AGAP  7  6   BUCR  39*  25*   AP  55   --    TP  7.4   --    ALB  2.2*   --    GLOB  5.2*   --    AGRAT  0.4*   --       CBC w/Diff Recent Labs      04/09/17 0332   WBC  15.5*   RBC  4.19*   HGB  13.0   HCT  40.0   PLT  155   GRANS  69   LYMPH  17*   EOS  1      Cardiac Enzymes No results for input(s): CPK, CKND1, YI in the last 72 hours.     No lab exists for component: CKRMB, TROIP   Coagulation Recent Labs      04/09/17 0332 04/08/17   0526   PTP  34.4*  29.6*   INR  3.7*  3.0*       Lipid Panel Lab Results   Component Value Date/Time    Cholesterol, total 110 09/03/2013 02:01 AM    HDL Cholesterol 42 09/03/2013 02:01 AM    LDL, calculated 56.6 09/03/2013 02:01 AM    VLDL, calculated 11.4 09/03/2013 02:01 AM    Triglyceride 57 09/03/2013 02:01 AM    CHOL/HDL Ratio 2.6 09/03/2013 02:01 AM BNP No results for input(s): BNPP in the last 72 hours.    Liver Enzymes Recent Labs      04/09/17   0332   TP  7.4   ALB  2.2*   AP  55   SGOT  34      Thyroid Studies Lab Results   Component Value Date/Time    TSH 1.75 01/09/2016 07:00 AM        Procedures/imaging: see electronic medical records for all procedures/Xrays and details which were not copied into this note but were reviewed prior to creation of Plan

## 2017-04-10 PROBLEM — R64 CACHEXIA (HCC): Status: ACTIVE | Noted: 2017-01-01

## 2017-04-10 PROBLEM — R62.7 FAILURE TO THRIVE IN ADULT: Status: ACTIVE | Noted: 2017-01-01

## 2017-04-10 NOTE — PROGRESS NOTES
Cm spoke with patient and wife at bedside, [facility] completed for [address] [facility] [phone] intake nurse Cortes Dhillon notified and will be contacting patients wife, aiming for tomorrow discharge.

## 2017-04-10 NOTE — PROGRESS NOTES
Problem: Mobility Impaired (Adult and Pediatric)  Goal: *Acute Goals and Plan of Care (Insert Text)  Physical Therapy Goals  Initiated 4/5/2017 and to be accomplished within 2-8 day(s)  1. Patient will move from supine to sit and sit to supine in bed with supervision/CGA. 2. Patient will transfer from bed to chair and chair to bed with supervision/CGA using the least restrictive device. 3. Patient will perform sit to stand with supervision/CGA. 4. Patient will ambulate with supervision/CGA for 150 feet with the least restrictive device. 5. Patient will ascend/descend 4 stairs with use of handrail(s) with minimal assistance/contact guard assist.   Outcome: Not Progressing Towards Goal  PHYSICAL THERAPY TREATMENT     Patient: Collin Moyer (89 y.o. male)  Date: 4/10/2017  Diagnosis: Acute pulmonary edema (HCC) Systolic CHF, acute (HCC)  Precautions: DNR, Fall   Chart, physical therapy assessment, plan of care and goals were reviewed. ASSESSMENT:  Pt motivated to participate with PT at this time but became increasingly SOB and dizzy during any mobility. While sitting at the EOB pt's SpO2 on 5L was 75% requiring pt to return to bed with HOB elevated. After ~3 minutes with verbal cuing to breathe in through his nose and out through his mouth his SpO2 was 88%; Dequan Diaz RN notified. Pt unable to participate with transfer training or gait training due to SOB and dizziness. Left pt in bed with HOB elevated and LEs elevated with all needs met. Progression toward goals:  [ ]      Improving appropriately and progressing toward goals  [X]      Improving slowly and progressing toward goals  [ ]      Not making progress toward goals and plan of care will be adjusted       PLAN:  Patient continues to benefit from skilled intervention to address the above impairments. Continue treatment per established plan of care.   Discharge Recommendations:  Home Health with 24 hour nursing care and Skilled Nursing Facility  Further Equipment Recommendations for Discharge:  hospital bed and rolling walker       SUBJECTIVE:   Patient stated I am doing ok.       OBJECTIVE DATA SUMMARY:   Critical Behavior:  Neurologic State: Alert  Orientation Level: Oriented to person, Oriented to place, Oriented to situation  Cognition: Follows commands, Appropriate for age attention/concentration  Safety/Judgement: Fall prevention  Functional Mobility Training:  Bed Mobility:  Rolling: Minimum assistance; Additional time  Supine to Sit: Minimum assistance; Additional time  Sit to Supine: Contact guard assistance; Additional time   Transfers:    Could not attempt today due to increased dizziness and SOB   Balance:  Sitting: Intact  Ambulation/Gait Training:   Could not attempt today due to increased dizziness and SOB   Therapeutic Exercises:   B ankle pumps x 5 reps  Pain:  Pain Scale 1: Numeric (0 - 10)  Pain Intensity 1: 0   Activity Tolerance:   Poor  Please refer to the flowsheet for vital signs taken during this treatment.   After treatment:   [ ] Patient left in no apparent distress sitting up in chair  [X] Patient left in no apparent distress in bed  [X] Call bell left within reach  [X] Nursing notified  [ ] Caregiver present  [ ] Bed alarm activated         Garret Mueller PT, DPT      Time Calculation: 8 mins

## 2017-04-10 NOTE — PROGRESS NOTES
NUTRITION FOLLOW-UP    RECOMMENDATIONS/PLAN:   - Continue Full Liquid Honey Thick diet per SLP with HT Ensure Enlive TID  - Pt continues with severe weight loss and poor intake ~20% of meals    NUTRITION ASSESSMENT:   Client Update: 80 yrs old Male who meets criteria for severe chronic protein calorie malnutrition plans to d/c home with hospice. Diet downgraded to Full Liquid Honey Thick today, consuming only 20% of meals on average over the past 4 days. Family opposed to PEG placement. Would also appreciate less strict glycemic control for pt's comfort as well per palliative care note. FOOD/NUTRITION INTAKE   Diet Order:  Full Liquid Honey Thick   Supplements: Ensure Enlive (HT) TID   Food Allergies: NKFA  Average PO Intake:      Patient Vitals for the past 100 hrs:   % Diet Eaten   04/10/17 0745 0 %   04/09/17 1925 25 %   04/09/17 1800 25 %   04/09/17 1427 10 %   04/09/17 1113 95 %   04/08/17 1925 0 %   04/08/17 1200 0 %   04/08/17 0800 0 %   04/07/17 1246 20 %   04/07/17 0900 20 %   Pertinent Medications:  [x] Reviewed; colace, synthroid, abx, dilantin, miralax, aldactone  Insulin:  [x]SSI  []Pre-meal   []Basal    []Drip  []None  Cultural/[place] Food Preferences: None Identified ANTHROPOMETRICS  Height: 5' 9\" (175.3 cm)       Weight: 54.6 kg (120 lb 5.9 oz)         BMI: 17.8 kg/m^2 underweight (Less than or = to 18.5% BMI)   Adm Weight: 132.5 lbs                Weight change: -12 lbs    NUTRITION-FOCUSED PHYSICAL ASSESSMENT  Skin: non-blanchable area to sacrum      GI: last BM 4/06, no N/V    BIOCHEMICAL DATA & MEDICAL TESTS  Pertinent Labs:  [x] Reviewed; POC -269, Na 149, CO2 38, BUN 59      NUTRITION PRESCRIPTION  Calories: 5948-4791 kcal/day based on 35-40 kcal/kg  Protein: 72-90 g/day based on 1.2-1.5 g/kg  CHO: 263 g/day based on 50% of total energy  Fluid: 2813-2720 ml/day based on 1 kcal/ml       NUTRITION DIAGNOSES:   1.  Inadequate oral intake related to dysphagia as evidenced by ongoing weight loss -17% x 3 months  - ongoing    NUTRITION INTERVENTIONS:   INTERVENTIONS:        GOALS:  1. Continue diet & ONS, aggressive interventions not indicated 1. >50% PO intake of meals, POC BG  mg/dl by next review 3-5 days     LEARNING NEEDS (Diet, Supplementation, Food/Nutrient-Drug Interaction):   [x] None Identified   [] Education provided/documented      Identified and patient: [] Declined   [] Was not appropriate/indicated        NUTRITION MONITORING /EVALUATION:   Follow PO intake  Monitor wt  Monitor for additional supplement needs     Previous Recommendations Implemented: yes        Previous Goals Met:  no -change in goals of care      [] Participated in Interdisciplinary Rounds    [x] Interdisciplinary Care Plan Reviewed  DISCHARGE NUTRITION RECOMMENDATIONS ADDRESSED:     [x] Yes- recommended diet per SLP    NUTRITION RISK:           [x] At risk                        [] Not currently at risk        Will follow-up per policy.   Joce Garrett RD  PAGER:  [phone]

## 2017-04-10 NOTE — PROGRESS NOTES
Problem: Dysphagia (Adult)  Goal: *Acute Goals and Plan of Care (Insert Text)  Dysphagia Present: moderate  Aspiration: at risk     Recommendations:  Diet: full liquid honey-thick  Meds: crushed  Aspiration Precautions  Other: small sips/bites, slow rate, alternate solids/liquids  1:1 supervision with meals  Ensure pt swallows before next bite sip  All PO via TSP    Goals: Patient will:  1. Tolerate PO trials with 0 s/s overt distress in 4/5 trials  2. Utilize compensatory swallow strategies/maneuvers (decrease bite/sip, size/rate, alt. liq/sol) with min cues in 4/5 trials  3. Complete an objective swallow study (i.e., MBSS) to assess swallow integrity, r/o aspiration, and determine of safest LRD, min A        Outcome: Progressing Towards Goal  SPEECH LANGUAGE PATHOLOGY DYSPHAGIA TREATMENT     Patient: Zion Gamble (63 y.o. male)  Date: 4/10/2017  Diagnosis: Acute pulmonary edema (HCC) Systolic CHF, acute (HCC)       Precautions: aspiration Fall      ASSESSMENT:  Moderate oropharyngeal dysphagia     Dysphagia f/u completed via structured snack of thin puree (applesauce) and honey-thick liquids, all PO via tsp. Tolerated x2oz each consistency without overt s/s penetration/aspiration during or ~10 minutes post PO, and stable respiration noted throughout. Delayed pharyngeal swallow response 3-5 seconds however consistently initiated (inconsistent on MBS study). Educated pt re: MBS results/recommendations, aspiration risk, diet consistencies and strategies for safety with PO intake. Also d/w RN and posted recs at bedside. Per chart and MD family has declined PEG and requesting to continue with PO. RN reports pt with overt cough with puree tray, may be related to thick puree nature; will modify diet to full liquid honey-thick to reduce risk of pharyngeal residual and reduce aspiration risk. ST to continue to follow.       Progression toward goals:  [ ]         Improving appropriately and progressing toward goals  [X] Improving slowly and progressing toward goals  [ ]         Not making progress toward goals and plan of care will be adjusted       PLAN: full liquid honey-thick  Recommendations and Planned Interventions:  ST to f/u for diet tolerance/advancement, education, swallow strategy training  Patient continues to benefit from skilled intervention to address the above impairments. Continue treatment per established plan of care. Discharge Recommendations:  Skilled Nursing Facility       SUBJECTIVE:   Patient stated OK. OBJECTIVE:   Cognitive and Communication Status:  Neurologic State: Alert  Orientation Level: Oriented to person, Oriented to place, Oriented to situation  Cognition: Follows commands, Appropriate for age attention/concentration  Perception: Appears intact  Perseveration: No perseveration noted  Safety/Judgement: Fall prevention  Dysphagia Treatment:  Oral Assessment:  Oral Assessment  Labial: Decreased rate, Right droop  Dentition: Intact, Natural  Oral Hygiene: good  Lingual: Decreased rate  Velum: No impairment  Mandible: No impairment  P.O.  Trials:              Patient Position: HOB 55              Vocal quality prior to P.O.: Low volume              Consistency Presented: Honey thick liquid, Puree              How Presented: SLP-fed/presented, Spoon              How Much:  (x2oz each)              Bolus Acceptance: No impairment              Bolus Formation/Control: Impaired              Type of Impairment: Mastication              Propulsion: Delayed (# of seconds), Discoordination              Oral Residue: None              Initiation of Swallow: Delayed (# of seconds)              Laryngeal Elevation: Decreased, Weak              Aspiration Signs/Symptoms: Infiltrate on chest xray              Pharyngeal Phase Characteristics: Audible swallow, Suspected pharyngeal residue              Effective Modifications: Small sips and bites, Spoon              Cues for Modifications: Minimal Oral Phase Severity: Moderate              Pharyngeal Phase Severity : Moderate                                                                                                                                                                                                                                                                                                                                                                                                                                                                                                                                                                                                                                                                                                                                                                                                                                                                                                                                                                              PAIN:  Start of Tx: 0  End of Tx: 0      After treatment:   [ ]              Patient left in no apparent distress sitting up in chair  [X]              Patient left in no apparent distress in bed  [X]              Call bell left within reach  [X]              Nursing notified  [ ]              Family present  [ ]              Caregiver present  [ ]              Bed alarm activated         COMMUNICATION/EDUCATION:   [X]        Aspiration precautions; swallow safety; compensatory techniques  [ ]        Patient unable to participate in education; education ongoing with staff  [X]        Posted safety precautions in patient's room.   [ ]         Oral-motor/laryngeal strengthening exercises        JAYY Mccarty  Time Calculation: 13 mins

## 2017-04-10 NOTE — PROGRESS NOTES
Shift summary:    Patient sleeping between care today, wife remained at the bedside throughout the day. Patient now eating, wife will discuss plan of care with round md on 4/10/17, and discharge planning. Patient high risk for aspiration, wife aware and would like to proceed with feeding patient and giving oral medications, Dr Missy Riley discussed with patient's wife today. Patient following commands and oriented x 2, reoriented to exact date and situation. Explained to the patient the importance of turning every 2 hours to prevent skin breakdown, patient verbalized understanding but needs reinforcement. Mepilex applied to sacrum for protection. Up with PT today and sat in chair for approximately 30 minutes, then back to bed. Wife continues to be supportive. Vital signs stable, will continue to monitor.

## 2017-04-10 NOTE — PROGRESS NOTES
Palliative Medicine Progress Note  [facility]: [phone] ([phone])  [facility]: [phone]   [facility]: [phone]    Patient Name: Aiden Hauser  YOB: 1933    Date of Initial Consult: 4/5/17  Reason for Consult: care decisions  Requesting Provider: Dr. Elba Patten   Primary Care Physician: Hussain Barnes MD      SUMMARY:   Aiden Hauser is a 80y.o. 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 Walking w/ cane and feeding self at home, though wife says he never completely got over last hospitalization. Prior to then had had about four hospitalizations for aspiration since major stroke in 2008. No other hospitalization in last year. Has lost significant wt in last six months. 4/6/17: Feels well, worked w/ PT yesterday. NPO due to results of MBS. It seems his dysphagia is worsening. 4/7/17: Worked w/ SLP yesterday who feels he only needs careful feeding w/ modified consistency of oral intake. He feels well, denies complaints. 4/10/17: continues w/ poor intake and evidence of persistent aspiration. SLP still working on diet modification to reduce risk. Hospitalists now recommending against attempt at rehab.      PALLIATIVE DIAGNOSES:     Patient Active Problem List   Diagnosis Code    Chronic systolic heart failure (HCC) I50.22    A-fib (HCC) I48.91    Type II or unspecified type diabetes mellitus without mention of complication, not stated as uncontrolled E11.9    History of CVA (cerebrovascular accident) Z80.78    CKD (chronic kidney disease) N18.9    Seizure disorder ([address] G40.909    Dysphagia R13.10    CHF (congestive heart failure) ([facility]) I50.9    Acute respiratory failure with hypercapnia ([facility]) J96.02    Aspiration pneumonia ([address] J69.0    Pulmonary fibrosis ([address] J84.10    Acute pulmonary edema ([address] J81.0    Systolic CHF, acute (HCC) I50.21    Constipation K59.00     1. PLAN:   1. Met with patient and his wife. He was largely non verbal. Reviewed clinical course and possibility that rapid readmission for aspiration may be a marker for worsening aspiration and general decline. Has AMD and DDNR on chart. She is not certain if he will be willing to discharge to SNF rather than home with PT if it is recommended. May derive only marginal benefit. Should he go home and certainly in the longer run she will need additional personal home care. Admission to SNF carries risk of hospital readmission. He denies current sxs. Questions answered. 4/6/17: Will likely have to choose whether to have PEG placed. Discussed the pros and cons of proceeding. PEG will not eliminate risk of aspiration, but probably help him maintain body weight. Could opt for tube and continue to have comfort feedings orally as long as they accept the risk. Questions answered. 4/7/17: Not clear he is going to thrive relying solely on po intake, but at least in theory he can continue to manage for awhile and doesn't need to consider PEG. Questions answered. 4/10/17: Discussed w/ patient and wife. If goal of care it to return home and maximize comfort, accepting aspiration risk and acknowleging his intake will not sustain him long term, hospice is a good option to meet this goal. Rehab would convey high risk of rehospitalization. Intermediate plan would be HH w/ PT w/ transition to hospice if poor intake and aspiration persist. He is not interested in this. They would like to d/c home w/ hospice. Consult placed. Would recommend d/c atorvastatin, replace warfarin w/ 160 mg ASA, and transition to less strict glycemic control w/ less insulin. 2. Initial consult note routed to primary continuity provider  3.  Communicated plan of care with: Palliative IDT       GOALS OF CARE:   Curative w/ limitations    Advance Care Planning 4/5/2017   Patient's Healthcare Decision Maker is: Named in scanned ACP document   Primary Decision Maker Name Ananda Dash   Primary Decision Maker Phone Number [phone]   Primary Decision Maker Relationship to Patient Spouse   Confirm Advance Directive Yes, on file   Does the patient have other document types Do Not Resuscitate; Power of [employer]; Other (comment)           HISTORY:     History obtained from: patient and wife    CHIEF COMPLAINT: see summary    HPI/SUBJECTIVE:    The patient is:   [] Verbal and participatory  [x] Non-participatory due to:   Minimal verbal response     Clinical Pain Assessment (nonverbal scale for nonverbal patients): Pain: 1  denies       FUNCTIONAL ASSESSMENT:     Palliative Performance Scale (PPS): 40  PPS: 50       PSYCHOSOCIAL/SPIRITUAL SCREENING:     Advance Care Planning:  Advance Care Planning 4/5/2017   Patient's Healthcare Decision Maker is: Named in scanned ACP document   Primary Decision Maker Name Ananda Dash   Primary Decision Maker Phone Number [phone]   Primary Decision Maker Relationship to Patient Spouse   Confirm Advance Directive Yes, on file   Does the patient have other document types Do Not Resuscitate; Power of [employer]; Other (comment)        Any spiritual / Bahai concerns:  [] Yes /  [x] No    Caregiver Burnout:  [] Yes /  [x] No /  [] No Caregiver Present      Anticipatory grief assessment:   [x] Normal  / [] Maladaptive       ESAS Anxiety: Anxiety: 0    ESAS Depression: Depression: 0        REVIEW OF SYSTEMS:     Positive and pertinent negative findings in ROS are noted above in HPI. The following systems were [] reviewed / [x] unable to be reviewed as noted in HPI  Other findings are noted below. Systems: constitutional, ears/nose/mouth/throat, respiratory, gastrointestinal, genitourinary, musculoskeletal, integumentary, neurologic, psychiatric, endocrine. Positive findings noted below.   Modified ESAS Completed by: provider   Fatigue: 6 Drowsiness: 0   Depression: 0 Pain: 1   Anxiety: 0 Nausea: 0   Anorexia: 6 Dyspnea: 0     Constipation: No     Stool Occurrence(s): 0        PHYSICAL EXAM:     Wt Readings from Last 3 Encounters:   04/10/17 54.6 kg (120 lb 5.9 oz)   01/03/17 65.2 kg (143 lb 12.8 oz)   01/12/16 69.9 kg (154 lb)     Blood pressure 111/62, pulse 86, temperature 98.1 °F (36.7 °C), resp. rate 16, height 5' 9\" (1.753 m), weight 54.6 kg (120 lb 5.9 oz), SpO2 95 %. Pain:  Pain Scale 1: Numeric (0 - 10)  Pain Intensity 1: 0              Pain Intervention(s) 1: Declines  Last bowel movement: yesterday    Constitutional: frail, NAD  Eyes: pupils equal, anicteric  ENMT: no nasal discharge, moist mucous membranes  Cardiovascular: regular rhythm, distal pulses intact  Respiratory: breathing not labored, symmetric  Gastrointestinal: soft non-tender, +bowel sounds  Musculoskeletal: no deformity, no tenderness to palpation  Skin: warm, dry  Neurologic: following commands, moving all extremities. R hemiparesis\  Psychiatric: full affect, no hallucinations  Other:       HISTORY:     Principal Problem:    Systolic CHF, acute ([address] (4/3/2017)    Active Problems:    A-fib ([address] (12/3/2012)      History of CVA (cerebrovascular accident) (12/4/2012)      CKD (chronic kidney disease) (12/4/2012)      Seizure disorder ([address] (9/2/2013)      Dysphagia (1/12/2016)      Acute pulmonary edema ([address] (4/2/2017)      Constipation (4/5/2017)      Past Medical History:   Diagnosis Date    Atrial fibrillation ([address]     CAD (coronary artery disease)     Heart failure (HCC)     High cholesterol     Hypertension     Kidney stone     Seizure ([address]     Stroke [facility])       Past Surgical History:   Procedure Laterality Date    CARDIAC SURG PROCEDURE UNLIST      aneurysm repair    HX UROLOGICAL      kidney stone removed      History reviewed. No pertinent family history. History reviewed, no pertinent family history.   Social History   Substance Use Topics    Smoking status: Former Smoker    Smokeless tobacco: Not on file    Alcohol use No     No Known Allergies   Current Facility-Administered Medications   Medication Dose Route Frequency    dextrose 5% infusion  50 mL/hr IntraVENous CONTINUOUS    phenytoin (DILANTIN) chewable tablet 100 mg  100 mg Oral TID    digoxin (LANOXIN) tablet 0.125 mg  0.125 mg Oral DAILY    levoFLOXacin (LEVAQUIN) tablet 500 mg  500 mg Oral Q24H    Warfarin - HOLD on 4/9/17   Other PRN    Warfarin - HOLD dose on 4/7   Other PRN    insulin lispro (HUMALOG) injection   SubCUTAneous AC&HS    glucose chewable tablet 16 g  16 g Oral PRN    glucagon (GLUCAGEN) injection 1 mg  1 mg IntraMUSCular PRN    dextrose (D50W) injection syrg 12.5-25 g  25-50 mL IntraVENous PRN    polyethylene glycol (MIRALAX) packet 17 g  17 g Oral BID    docusate sodium (COLACE) capsule 100 mg  100 mg Oral DAILY    guaiFENesin ER (MUCINEX) tablet 600 mg  600 mg Oral Q12H    metoprolol (LOPRESSOR) injection 1.25 mg  1.25 mg IntraVENous Q6H PRN    valsartan (DIOVAN) tablet 20 mg  20 mg Oral DAILY    budesonide (PULMICORT) 500 mcg/2 ml nebulizer suspension  500 mcg Nebulization BID RT    albuterol-ipratropium (DUO-NEB) 2.5 MG-0.5 MG/3 ML  3 mL Nebulization Q4H PRN    atorvastatin (LIPITOR) tablet 40 mg  40 mg Oral DAILY    carvedilol (COREG) tablet 3.125 mg  3.125 mg Oral BID WITH MEALS    levothyroxine (SYNTHROID) tablet 50 mcg  50 mcg Oral ACB    spironolactone (ALDACTONE) tablet 25 mg  25 mg Oral DAILY    acetaminophen (TYLENOL) tablet 650 mg  650 mg Oral Q4H PRN    ondansetron (ZOFRAN) injection 4 mg  4 mg IntraVENous Q4H PRN    Warfarin - Pharmacy to Dose  1 Each Other Rx Dosing/Monitoring        LAB AND IMAGING FINDINGS:     Lab Results   Component Value Date/Time    WBC 13.7 04/10/2017 04:45 AM    HGB 12.7 04/10/2017 04:45 AM    PLATELET 057 24/17/3378 04:45 AM     Lab Results   Component Value Date/Time    Sodium 149 04/10/2017 04:45 AM    Potassium 4.0 04/10/2017 04:45 AM Chloride 107 04/10/2017 04:45 AM    CO2 38 04/10/2017 04:45 AM    BUN 59 04/10/2017 04:45 AM    Creatinine 1.33 04/10/2017 04:45 AM    Calcium 9.2 04/10/2017 04:45 AM    Magnesium 2.4 04/10/2017 04:45 AM    Phosphorus 2.8 12/03/2009 03:28 AM      Lab Results   Component Value Date/Time    AST (SGOT) 34 04/09/2017 03:32 AM    Alk.  phosphatase 55 04/09/2017 03:32 AM    Protein, total 7.4 04/09/2017 03:32 AM    Albumin 2.2 04/09/2017 03:32 AM    Globulin 5.2 04/09/2017 03:32 AM     Lab Results   Component Value Date/Time    INR 3.5 04/10/2017 04:45 AM    Prothrombin time 33.2 04/10/2017 04:45 AM    aPTT 51.0 04/02/2017 03:40 AM      No results found for: IRON, FE, TIBC, IBCT, PSAT, FERR   No results found for: PH, PCO2, PO2  No components found for: [place]   Lab Results   Component Value Date/Time    CK 81 04/03/2017 07:25 PM    CK - MB <1.0 04/03/2017 07:25 PM              Total time: 35 min  Counseling / coordination time: 35  > 50% counseling / coordination      Sangeetha Estrada MD  Palliative Medicine

## 2017-04-10 NOTE — ROUTINE PROCESS
Bedside, Verbal shift change report given to Lucio Pfeiffer RN (oncoming nurse) by Yovani Laura RN (offgoing nurse). Report included the following information SBAR, Kardex, ED Summary, Procedure Summary, Intake/Output, MAR, Accordion, Recent Results and Med Rec Status.

## 2017-04-10 NOTE — PROGRESS NOTES
Pharmacy Dosing Services: Warfarin     Consult for Warfarin Dosing by Pharmacy by Dr. Jurline Klinefelter provided for this 80 y.o.  male , for indication of Atrial Fibrillation. Dose to achieve an INR goal of 2-3    Hold Warfarin today ( INR = 3.5 )      Significant drug interactions: Levaquin / Phenytoin   PT/INR Lab Results   Component Value Date/Time    INR 3.5 04/10/2017 04:45 AM      Platelets Lab Results   Component Value Date/Time    PLATELET 053 46/62/4974 04:45 AM      H/H     Lab Results   Component Value Date/Time    HGB 12.7 04/10/2017 04:45 AM        Warfarin Administrations (last 168 hours)       Date/Time Action Medication Dose      04/06/17 1803 Given    warfarin (COUMADIN) tablet 3 mg 3 mg    04/04/17 1956 Given    warfarin (COUMADIN) tablet 4 mg 4 mg    04/03/17 1743 Given    warfarin (COUMADIN) tablet 4 mg 4 mg          Pharmacy to follow daily and will provide subsequent Warfarin dosing based on clinical status.   CLAUDIA Chapman [facility])  Contact information [phone]

## 2017-04-10 NOTE — PROGRESS NOTES
Problem: Mobility Impaired (Adult and Pediatric)  Goal: *Acute Goals and Plan of Care (Insert Text)  Physical Therapy Goals  Initiated 4/5/2017 and to be accomplished within 2-8 day(s)  1. Patient will move from supine to sit and sit to supine in bed with supervision/CGA. 2. Patient will transfer from bed to chair and chair to bed with supervision/CGA using the least restrictive device. 3. Patient will perform sit to stand with supervision/CGA. 4. Patient will ambulate with supervision/CGA for 150 feet with the least restrictive device. 5. Patient will ascend/descend 4 stairs with use of handrail(s) with minimal assistance/contact guard assist.   Pt refused PT due to:     [ ]  Nausea/vomiting  [ ]  Eating  [ ]  Pain  [ ]  Pt lethargic  [X]  Pt attempting to urinate; his wife requested PT return at later time as it takes him increased time to urinate. Will f/u later as pt's schedule allows. Thank you.      Jackie Mueller PT, DPT

## 2017-04-10 NOTE — PROGRESS NOTES
Hospitalist Progress Note-critical care note     Patient: Celestine Gastelum MRN: [id number]  CSN: [id number]    YOB: 1933  Age: 80 y.o. Sex: male    DOA: 4/2/2017 LOS:  LOS: 8 days            Chief complaint: chf exacerbation, pulmonary fibrosis. A fib , aspiration pneumonia ,dysphagia     Assessment/Plan         Patient Active Problem List   Diagnosis Code    Chronic systolic heart failure ([facility]) I50.22    A-fib ([facility]) I48.91    Type II or unspecified type diabetes mellitus without mention of complication, not stated as uncontrolled E11.9    History of CVA (cerebrovascular accident) Z80.78    CKD (chronic kidney disease) N18.9    Seizure disorder ([address] G40.909    Dysphagia R13.10    CHF (congestive heart failure) ([facility]) I50.9    Acute respiratory failure with hypercapnia ([facility]) J96.02    Aspiration pneumonia ([address] J69.0    Pulmonary fibrosis ([address] J84.10    Acute pulmonary edema ([facility]) Q54.7    Systolic CHF, acute ([address] I50.21    Constipation K59.00     1. CHF systolic acute. EF 45%. cardiology was on board,   Lasix was hold due to low bp      2. Atrial fibrillation. On warfarin, rate controlled per coreg/digoxin ,inr daily , will hold warfarin due to inr 3.5    3. CKD. Continue monitor, stable     4. Hx CVA. Fall precaution     5. Seizure disorder. On dilantin,     6. Hypothyroidism. Chronic thrombocytopenia. 7. Dysphagia. Dysphagia diet, aspiration precaution ,  barium swallowing test done     8 aphagia   Due to the stroke     9 debility  10 hypotension  Resolved     11. Pulmonary fibrosis ,   Continue breathing tx.  mucinex     12 constipation    Continue colace/mirilax    13 aspiration pna  Continue levaquin day 5 and aspiration precaution. 14 failure  to thrive   15 cachexia,severe malnutrition      Subjective; fine     Wife   was at the bedside. She does not want pt to have PEG tube.  Patient's condition dose not improving from treatment, he has severe malnutrition due to dysphagia, he is unable to thrive. Hospice option discussed with wife, she showed interest, case discussed with palliative care. All questions have been answered. 45 total min's spent on patient care including >50% on counseling/coordinating care. Discussed the above assessments. also discussed labs, medications and hospital course            Review of systems:    General: No fevers or chills. Tired   Cardiovascular: No chest pain or pressure. No palpitations. Pulmonary: No shortness of breath. Coughs better   Gastrointestinal: No nausea, vomiting. Vital signs/Intake and Output:  Visit Vitals    /62 (BP 1 Location: Left arm, BP Patient Position: At rest;Supine; Head of bed elevated (Comment degrees))    Pulse 86    Temp 98.1 °F (36.7 °C)    Resp 16    Ht 5' 9\" (1.753 m)    Wt 54.6 kg (120 lb 5.9 oz)    SpO2 90%    BMI 17.78 kg/m2     Current Shift:     Last three shifts:  04/08 1901 - 04/10 0700  In: 470 [P.O.:470]  Out: 0     Physical Exam:  General: WD, WN. Alert, cooperative, no acute distress, cachexia    HEENT: NC, Atraumatic. PERRLA, anicteric sclerae. Lungs: Mild rales, no wheezing   Heart:  Regular  rhythm,  + murmur, No Rubs, No Gallops  Abdomen: Soft, Non distended, Non tender.  +Bowel sounds,   Extremities: No c/c/e  Psych:   Not anxious or agitated. Neurologic:  No acute neurological deficit.              Labs: Results:       Chemistry Recent Labs      04/10/17   0445  04/09/17   0332   GLU  157*  158*   NA  149*  148*   K  4.0  4.1   CL  107  106   CO2  38*  35*   BUN  59*  58*   CREA  1.33*  1.49*   CA  9.2  9.5   AGAP  4  7   BUCR  44*  39*   AP   --   55   TP   --   7.4   ALB   --   2.2*   GLOB   --   5.2*   AGRAT   --   0.4*      CBC w/Diff Recent Labs      04/10/17   0445  04/09/17   0332   WBC  13.7*  15.5*   RBC  4.12*  4.19*   HGB  12.7*  13.0   HCT  39.3  40.0   PLT  174  155   GRANS  65  69   LYMPH  20*  17*   EOS  2  1      Cardiac Enzymes No results for input(s): CPK, CKND1, IY in the last 72 hours. No lab exists for component: CKRMB, TROIP   Coagulation Recent Labs      04/10/17   0445  04/09/17 0332   PTP  33.2*  34.4*   INR  3.5*  3.7*       Lipid Panel Lab Results   Component Value Date/Time    Cholesterol, total 110 09/03/2013 02:01 AM    HDL Cholesterol 42 09/03/2013 02:01 AM    LDL, calculated 56.6 09/03/2013 02:01 AM    VLDL, calculated 11.4 09/03/2013 02:01 AM    Triglyceride 57 09/03/2013 02:01 AM    CHOL/HDL Ratio 2.6 09/03/2013 02:01 AM      BNP No results for input(s): BNPP in the last 72 hours.    Liver Enzymes Recent Labs      04/09/17 0332   TP  7.4   ALB  2.2*   AP  55   SGOT  34      Thyroid Studies Lab Results   Component Value Date/Time    TSH 1.75 01/09/2016 07:00 AM        Procedures/imaging: see electronic medical records for all procedures/Xrays and details which were not copied into this note but were reviewed prior to creation of Hermann Garcia MD

## 2017-04-10 NOTE — PROGRESS NOTES
[address] care of pt from Shira Harding RN. Pt resting quietly in bed, wife at bedside , no signs of distress, call bell within reach. Vernell Line 0754 Assessment completed, pt denies pain or discomfort. Pt's wife feeding pt slowly at bedside, however states that he does not seem to want to eat a whole lot. Pt able to answer yes or no questions and mouth responses, Will continue to monitor     1030 IDR with  at bedside    1137 Notified by PT that pt desated to 75% while ambulating on 5L NC, sat back down increased to 88%, pt laying down 02 increased to 6L NC, encouraged to breathe in through nose, out through mouth, O2 sats 90%, will continue to monitor, wife at bedside. Pt denies any pain or discomfort. 1650 Notified that pt blood pressure was 86/43, at rest. Took bp manually at it was 110/50. Will continue to monitor. 36 Called to the room, pt was having difficulty breathing, W Matthew and PCT at bedside, adjusting pt higher in bed and sitting him up in semi fowlers, when asked if he was still having shortness of breath after repositioning he shook his head no. Pt O2 sats 93% on 6L NC. Offered PRN Duo Neb if pt felt like it would be beneficial, he declined. 1845 RT called, per pt's wife request, when I asked if he was felt like he was having difficulty breathing pt shook his head no, however pt's wife came to nurses station and requested that he get one. Shift Summary: Shift uneventful. During PT pt desat to 75% after sitting down and increasing O2 to 6L, pt O2 sats 90%. Pt was more comfortably when he was laying supine, in a fetal position, however pt would become dyspneic and need to be repositioned.

## 2017-04-10 NOTE — PROGRESS NOTES
1925: Assumed patient care, he was alert and oriented to person, place, time and situation. Vital signs were stable. MEWS score was a one. Respiratory status was stable on 3L via nasal cannula. Patient denied any pain, discomfort, nausea, vomiting, dizziness or anxiety. FLACC score suggested that he was comfortable. White board was updated and explained. Bed was locked and in lowest position. Call bell and personal belongings were within reach. Patient was on aspiration precautions. He had no questions, comments, or concerns after bedside shift report. 0700: Patient had an uneventful shift. Respiratory status, vital signs and MEWS score remained stable. Patient had no questions, comments or concerns after bedside shift report.

## 2017-04-11 NOTE — ROUTINE PROCESS
Bedside and Verbal shift change report given to TAMIR Kay (oncoming nurse) by Belen Malagon RN (offgoing nurse). Report included the following information SBAR, Kardex, ED Summary, Procedure Summary, Intake/Output, MAR, Accordion, Recent Results and Med Rec Status.

## 2017-04-11 NOTE — CDMP QUERY
Please clarify if aspiration pneumonia was present or not present on admission.    =>  =>Other Explanation of clinical findings  =>Unable to Determine (no explanation of clinical findings)    The medical record reflects the following:    Risk: h/o adm for aspiration pna in Jan, h/o CVA, dysphagia    Clinical Indicators: adm 4/2 with CHF exac. + cough. Per ED note: \"Lungs: very wet sounding with simple, normal respiratory effort. \"  Per 4/5 IM note: \"Will start levaquin for aspiration pna\"     Per Speech eval: at risk for aspiration    Treatment: IV abx, speech eval, aspiration precautions. Please clarify and document your clinical opinion in the progress notes and discharge summary including the definitive and/or presumptive diagnosis, (suspected or probable), related to the above clinical findings. Please include clinical findings supporting your diagnosis. If you DECLINE this query or would like to communicate with [facility], please utilize the \"[employer] message box\" at the TOP of the Progress Note on the right.       Thank you,    Iola Cogan [phone]  [address] [place] [address],[address] [phone]

## 2017-04-11 NOTE — PROGRESS NOTES
Problem: Mobility Impaired (Adult and Pediatric)  Goal: *Acute Goals and Plan of Care (Insert Text)  Physical Therapy Goals  Initiated 4/5/2017 and to be accomplished within 2-8 day(s)  1. Patient will move from supine to sit and sit to supine in bed with supervision/CGA. 2. Patient will transfer from bed to chair and chair to bed with supervision/CGA using the least restrictive device. 3. Patient will perform sit to stand with supervision/CGA. 4. Patient will ambulate with supervision/CGA for 150 feet with the least restrictive device. 5. Patient will ascend/descend 4 stairs with use of handrail(s) with minimal assistance/contact guard assist.   Outcome: Not Progressing Towards Goal  Pt refused PT due to:  [X]  SOB On NRB  [ ]  Eating  [ ]  Pain  [ ]  Pt lethargic  [ ]  Off Unit  Will f/u later as schedule allows. Thank you.   Laura Moore, PTA

## 2017-04-11 NOTE — PROGRESS NOTES
Called to bedside for [facility] per family request.  BS's with increasing congestion, very weak cough, desaturation episodes with movement, repositioning, nursing care. Plan is possibly home with hospice on 4/11/2017. Discussed with family and RN overnight plan of care to include increased pulmonary toileting, in order to facilitate 4/11/2017 discharge. Aspiration is probable cause of increased congestion. Orders for NT SXNING prn obtained.

## 2017-04-11 NOTE — PROGRESS NOTES
Hospitalist Progress Note-critical care note     Patient: Angely Joyner MRN: [id number]  CSN: [id number]    YOB: 1933  Age: 80 y.o. Sex: male    DOA: 4/2/2017 LOS:  LOS: 9 days            Chief complaint: chf exacerbation, pulmonary fibrosis. A fib , aspiration pneumonia ,dysphagia     Assessment/Plan         Patient Active Problem List   Diagnosis Code    Chronic systolic heart failure ([facility]) I50.22    A-fib ([facility]) I48.91    Type II or unspecified type diabetes mellitus without mention of complication, not stated as uncontrolled E11.9    History of CVA (cerebrovascular accident) Z80.78    CKD (chronic kidney disease) N18.9    Seizure disorder ([address] G40.909    Dysphagia R13.10    CHF (congestive heart failure) ([facility]) I50.9    Acute respiratory failure with hypercapnia ([facility]) J96.02    Aspiration pneumonia ([address] J69.0    Pulmonary fibrosis ([address] J84.10    Acute pulmonary edema ([facility]) G28.2    Systolic CHF, acute ([address] I50.21    Constipation K59.00    Failure to thrive in adult R62.7    Cachexia ([address] R64     1. CHF systolic acute. EF 45%. cardiology signed off   Lasix was given Am . 2. Atrial fibrillation. On warfarin, rate controlled per coreg/digoxin     3. CKD. Continue monitor, stable     4. Hx CVA. Fall precaution     5. Seizure disorder. Ativan prn     6. Hypothyroidism. 7. Dysphagia. Dysphagia diet, comfort feeding      8 aphagia   Due to the stroke     9 debility  10 hypotension  Resolved     11. Pulmonary fibrosis ,   Continue breathing tx.  mucinex     12 constipation      13 aspiration pna (not present on admission)  Continue levaquin day 6   14 failure  to thrive   15 cachexia,severe malnutrition      Pt continues declined clinically. Lasix was given for sob with non rebreathing mask. Repeated cxr: Findings of asbestos related pleural disease, with bilateral airspace opacities. Wife stated patient has no quality of life, Brittney Soto is ready to go\".  She does not want him to suffer and want to keep him comfortable. Discussion with wife regarding his current clinical condition, and discussed options of care including continuing aggressive care including further imaging, blood gasses, antibiotic therapy, cardiac monitoring etc versus comfort measures with goal of treating symptoms and allowing a natural death. At this time they wish to proceed withcomfort measures. All questions have been answered. 45 total min's spent on patient care including >50% on counseling/coordinating care. Discussed the above assessments. also discussed labs, medications and hospital course      Review of systems:  Unable to obtain due to on mask     Vital signs/Intake and Output:  Visit Vitals    /61    Pulse (!) 102    Temp 98.4 °F (36.9 °C)    Resp 18    Ht 5' 9\" (1.753 m)    Wt 55.5 kg (122 lb 5.7 oz)    SpO2 98%    BMI 18.07 kg/m2     Current Shift:     Last three shifts:  04/09 1901 - 04/11 0700  In: 476.7 [P.O.:100; I.V.:376.7]  Out: 0     Physical Exam:  General: WD, WN. Alert, cooperative, in mild  distress, cachexia    HEENT: NC, Atraumatic. PERRLA, anicteric sclerae. Lungs: Mild rales, no wheezing   Heart:  Regular  rhythm,  + murmur, No Rubs, No Gallops  Abdomen: Soft, Non distended, Non tender.  +Bowel sounds,   Extremities: No c/c/e  Psych:   Not anxious or agitated.   Neurologic:  Unable to detect due to on mask           Labs: Results:       Chemistry Recent Labs      04/10/17   0445  04/09/17   0332   GLU  157*  158*   NA  149*  148*   K  4.0  4.1   CL  107  106   CO2  38*  35*   BUN  59*  58*   CREA  1.33*  1.49*   CA  9.2  9.5   AGAP  4  7   BUCR  44*  39*   AP   --   55   TP   --   7.4   ALB   --   2.2*   GLOB   --   5.2*   AGRAT   --   0.4*      CBC w/Diff Recent Labs      04/10/17   0445  04/09/17   0332   WBC  13.7*  15.5*   RBC  4.12*  4.19*   HGB  12.7*  13.0   HCT  39.3  40.0   PLT  174  155   GRANS  65  69   LYMPH  20*  17*   EOS  2  1      Cardiac Enzymes No results for input(s): CPK, CKND1, YI in the last 72 hours. No lab exists for component: CKRMB, TROIP   Coagulation Recent Labs      04/11/17   0430  04/10/17   0445   PTP  38.9*  33.2*   INR  4.3*  3.5*       Lipid Panel Lab Results   Component Value Date/Time    Cholesterol, total 110 09/03/2013 02:01 AM    HDL Cholesterol 42 09/03/2013 02:01 AM    LDL, calculated 56.6 09/03/2013 02:01 AM    VLDL, calculated 11.4 09/03/2013 02:01 AM    Triglyceride 57 09/03/2013 02:01 AM    CHOL/HDL Ratio 2.6 09/03/2013 02:01 AM      BNP No results for input(s): BNPP in the last 72 hours.    Liver Enzymes Recent Labs      04/09/17   0332   TP  7.4   ALB  2.2*   AP  55   SGOT  34      Thyroid Studies Lab Results   Component Value Date/Time    TSH 1.75 01/09/2016 07:00 AM        Procedures/imaging: see electronic medical records for all procedures/Xrays and details which were not copied into this note but were reviewed prior to creation of Starla Sharma MD

## 2017-04-11 NOTE — PROGRESS NOTES
Pt unavailable during first treatment attempt at 9:24 AM. SLP to f/u later this date.      Thank you for this referral,   Krystal Louise, SLP

## 2017-04-11 NOTE — PROGRESS NOTES
NT sxning performed with moderate amount of pale yellow, thick sputum obtained. Tolerated well, with hyper- oxygenation, 100% NRB utilized. O2 sats stayed at 94%.   HOB elevated, O2 at 6lpm.

## 2017-04-11 NOTE — PROGRESS NOTES
PRN Duonebs given Q4 thru night. Nt sxn'd last at [phone] for large amount thick yellow secretions. Breath sounds remain very coarse R>L.

## 2017-04-11 NOTE — PROGRESS NOTES
Sam spoke with  at this time plan is for pt to be moved to medical unit on comfort measures, not stable for transport home,sam did update Deborah from [address], spoke with wife at bedside.

## 2017-04-11 NOTE — PROGRESS NOTES
0700 Assumed care of pt from Zion Luna, KANCHAN. Pt receiving neb treatment RT at bedside ,call bell within reach, denied pain or discomfort. 0720 Assessment completed, pt alert however visibly uncomfortable, vital signs taken O2 sats 88% on 6L NC, lung sounds coarse bilaterally after Neb treatment. Asked wife who is at bedside, what interventions she was comfortable with making, pt's wife would like to talk to MD, however wants to make pt \"as comfortable as possible\" paged RAJ Huang, KANCHAN with palliative care and also paged . Switched pt to 15L on non rebreather, O2 sats 94%, pt nods head \"yes\" when asked if he is feeling more comfortable. [address] with , updated on pt condition overnight and currently, orders to dc IVF, give one time dose of IV Lasix 10 mg and order stat CXR. MD is coming to  Bedside to speak with wife     0830 Pt O2 sats on non rebreather 96%, appears calm and shakes his head no when asked if he is feeling short of breath, gave me the \"thumbs up sign\" when asked if he was breathing a little better with the non rebreather. 56 Spoke with  okay to hold PO medications for the time being. 1210 Morphine 100 mg/5mLconcentrated solution 10 mg was given for dyspnea. 1437 Morphine 100 mg/5mL concentrated solution 10 mg was given for dyspnea. 1500 Pt was transitioned back to NC, pt's wife states that she felt \"more comfortable\" with the non rebreather on.     0 Spoke with pt and pt's wife, and explained why MD's want pt to use NC instead of non-rebreather, pt 02 has remained ~93% on 6L NC. Large family at bedside. Shift Summary- Shift uneventful. Pt was transitioned to comfort care.

## 2017-04-11 NOTE — PROGRESS NOTES
Palliative Medicine Progress Note  [facility]: [phone]  [facility]: [phone] [address]: [phone]    Patient Name: Johanna Mast  YOB: 1933    Date of Initial Consult: 4/5/17  Reason for Consult: care decisions  Requesting Provider: Dr. Mady Henriquez   Primary Care Physician: Betty Hernandez MD      SUMMARY:   Johanna Mast is a 80y.o. 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 Walking w/ cane and feeding self at home, though wife says he never completely got over last hospitalization. Prior to then had had about four hospitalizations for aspiration since major stroke in 2008. No other hospitalization in last year. Has lost significant wt in last six months. 4/6/17: Feels well, worked w/ PT yesterday. NPO due to results of MBS. It seems his dysphagia is worsening. 4/7/17: Worked w/ SLP yesterday who feels he only needs careful feeding w/ modified consistency of oral intake. He feels well, denies complaints. 4/10/17: continues w/ poor intake and evidence of persistent aspiration. SLP still working on diet modification to reduce risk. Hospitalists now recommending against attempt at rehab.    4/11/14: For home w/ hospice today, but developed hypotension and hypoxia overnight. Now on 100% NRB. Says he does feel SOB.      PALLIATIVE DIAGNOSES:     Patient Active Problem List   Diagnosis Code    Chronic systolic heart failure (HCC) I50.22    A-fib ([facility]) I48.91    Type II or unspecified type diabetes mellitus without mention of complication, not stated as uncontrolled E11.9    History of CVA (cerebrovascular accident) Z80.78    CKD (chronic kidney disease) N18.9    Seizure disorder ([address] G40.909    Dysphagia R13.10    CHF (congestive heart failure) ([facility]) I50.9    Acute respiratory failure with hypercapnia ([address] J96.02    Aspiration pneumonia (HCC) J69.0    Pulmonary fibrosis ([facility] J84.10    Acute pulmonary edema (HCC) F20.3    Systolic CHF, acute (HCC) I50.21    Constipation K59.00    Failure to thrive in adult R62.7    Cachexia ([facility] R64     1. PLAN:   1. Met with patient and his wife. He was largely non verbal. Reviewed clinical course and possibility that rapid readmission for aspiration may be a marker for worsening aspiration and general decline. Has AMD and DDNR on chart. She is not certain if he will be willing to discharge to SNF rather than home with PT if it is recommended. May derive only marginal benefit. Should he go home and certainly in the longer run she will need additional personal home care. Admission to SNF carries risk of hospital readmission. He denies current sxs. Questions answered. 4/6/17: Will likely have to choose whether to have PEG placed. Discussed the pros and cons of proceeding. PEG will not eliminate risk of aspiration, but probably help him maintain body weight. Could opt for tube and continue to have comfort feedings orally as long as they accept the risk. Questions answered. 4/7/17: Not clear he is going to thrive relying solely on po intake, but at least in theory he can continue to manage for awhile and doesn't need to consider PEG. Questions answered. 4/10/17: Discussed w/ patient and wife. If goal of care it to return home and maximize comfort, accepting aspiration risk and acknowleging his intake will not sustain him long term, hospice is a good option to meet this goal. Rehab would convey high risk of rehospitalization. Intermediate plan would be HH w/ PT w/ transition to hospice if poor intake and aspiration persist. He is not interested in this. They would like to d/c home w/ hospice. Consult placed. Would recommend d/c atorvastatin, replace warfarin w/ 160 mg ASA, and transition to less strict glycemic control w/ less insulin.     4/11/14: Initial thought is volume overload, but not consistent w/ exam and hypotension. Aspiration event vs bleeding, PE, etc. CXR pending. Will need to delay transfer home until respiratory status more stable. If progressive decline, would begin full comfort orders here. 2. Initial consult note routed to primary continuity provider  3. Communicated plan of care with: Palliative IDT       GOALS OF CARE:   Curative w/ limitations    Advance Care Planning 4/5/2017   Patient's Healthcare Decision Maker is: Named in scanned ACP document   Primary Decision Maker Name Amanda Yañez   Primary Decision Maker Phone Number [phone]   Primary Decision Maker Relationship to Patient Spouse   Confirm Advance Directive Yes, on file   Does the patient have other document types Do Not Resuscitate; Power of [employer]; Other (comment)           HISTORY:     History obtained from: patient and wife    CHIEF COMPLAINT: see summary    HPI/SUBJECTIVE:    The patient is:   [] Verbal and participatory  [x] Non-participatory due to:   Minimal verbal response     Clinical Pain Assessment (nonverbal scale for nonverbal patients): Pain: 1  denies       FUNCTIONAL ASSESSMENT:     Palliative Performance Scale (PPS): 40  PPS: 40       PSYCHOSOCIAL/SPIRITUAL SCREENING:     Advance Care Planning:  Advance Care Planning 4/5/2017   Patient's Healthcare Decision Maker is: Named in scanned ACP document   Primary Decision Maker Name Amanda Yañez   Primary Decision Maker Phone Number [phone]   Primary Decision Maker Relationship to Patient Spouse   Confirm Advance Directive Yes, on file   Does the patient have other document types Do Not Resuscitate; Power of [employer]; Other (comment)        Any spiritual / Sabianism concerns:  [] Yes /  [x] No    Caregiver Burnout:  [] Yes /  [x] No /  [] No Caregiver Present      Anticipatory grief assessment:   [x] Normal  / [] Maladaptive       ESAS Anxiety: Anxiety: 1    ESAS Depression: Depression: 0        REVIEW OF SYSTEMS:     Positive and pertinent negative findings in ROS are noted above in HPI. The following systems were [] reviewed / [x] unable to be reviewed as noted in HPI  Other findings are noted below. Systems: constitutional, ears/nose/mouth/throat, respiratory, gastrointestinal, genitourinary, musculoskeletal, integumentary, neurologic, psychiatric, endocrine. Positive findings noted below. Modified ESAS Completed by: provider   Fatigue: 6 Drowsiness: 4   Depression: 0 Pain: 1   Anxiety: 1 Nausea: 0   Anorexia: 6 Dyspnea: 6     Constipation: No     Stool Occurrence(s): 0        PHYSICAL EXAM:     Wt Readings from Last 3 Encounters:   04/11/17 55.5 kg (122 lb 5.7 oz)   01/03/17 65.2 kg (143 lb 12.8 oz)   01/12/16 69.9 kg (154 lb)     Blood pressure 95/51, pulse (!) 102, temperature 98.3 °F (36.8 °C), resp. rate 18, height 5' 9\" (1.753 m), weight 55.5 kg (122 lb 5.7 oz), SpO2 94 %. Pain:  Pain Scale 1: Adult Nonverbal Pain Scale  Pain Intensity 1: 3  Pain Onset 1: generalized           Pain Intervention(s) 1: MD notified (comment)  Last bowel movement: yesterday    Constitutional: frail, NAD on NRB  Eyes: pupils equal, anicteric  ENMT: no nasal discharge, moist mucous membranes  Cardiovascular: regular rhythm, distal pulses intact  Respiratory: breathing not labored, symmetric  Gastrointestinal: soft non-tender, +bowel sounds  Musculoskeletal: no deformity, no tenderness to palpation  Skin: warm, dry  Neurologic: following commands, moving all extremities.  R hemiparesis\  Psychiatric: full affect, no hallucinations  Other:       HISTORY:     Principal Problem:    Systolic CHF, acute ([address] (4/3/2017)    Active Problems:    A-fib ([address] (12/3/2012)      History of CVA (cerebrovascular accident) (12/4/2012)      CKD (chronic kidney disease) (12/4/2012)      Seizure disorder ([address] (9/2/2013)      Dysphagia (1/12/2016)      Acute pulmonary edema ([address] (4/2/2017)      Constipation (4/5/2017)      Failure to thrive in adult (4/10/2017) Cachexia ([facility] (4/10/2017)      Past Medical History:   Diagnosis Date    At[facility] fibrillation ([facility]     CAD (coronary artery disease)     Heart failure (HCC)     High cholesterol     Hypertension     Kidney stone     Seizure ([facility]     Stroke [facility])       Past Surgical History:   Procedure Laterality Date    CARDIAC SURG PROCEDURE UNLIST      aneurysm repair    HX UROLOGICAL      kidney stone removed      History reviewed. No pertinent family history. History reviewed, no pertinent family history.   Social History   Substance Use Topics    Smoking status: Former Smoker    Smokeless tobacco: Not on file    Alcohol use No     No Known Allergies   Current Facility-Administered Medications   Medication Dose Route Frequency    morphine (ROXANOL) 100 mg/5 mL (20 mg/mL) concentrated solution 10 mg  10 mg Oral Q1H PRN    phenytoin (DILANTIN) chewable tablet 100 mg  100 mg Oral TID    digoxin (LANOXIN) tablet 0.125 mg  0.125 mg Oral DAILY    levoFLOXacin (LEVAQUIN) tablet 500 mg  500 mg Oral Q24H    insulin lispro (HUMALOG) injection   SubCUTAneous AC&HS    glucose chewable tablet 16 g  16 g Oral PRN    glucagon (GLUCAGEN) injection 1 mg  1 mg IntraMUSCular PRN    dextrose (D50W) injection syrg 12.5-25 g  25-50 mL IntraVENous PRN    polyethylene glycol (MIRALAX) packet 17 g  17 g Oral BID    docusate sodium (COLACE) capsule 100 mg  100 mg Oral DAILY    guaiFENesin ER (MUCINEX) tablet 600 mg  600 mg Oral Q12H    metoprolol (LOPRESSOR) injection 1.25 mg  1.25 mg IntraVENous Q6H PRN    valsartan (DIOVAN) tablet 20 mg  20 mg Oral DAILY    budesonide (PULMICORT) 500 mcg/2 ml nebulizer suspension  500 mcg Nebulization BID RT    albuterol-ipratropium (DUO-NEB) 2.5 MG-0.5 MG/3 ML  3 mL Nebulization Q4H PRN    atorvastatin (LIPITOR) tablet 40 mg  40 mg Oral DAILY    carvedilol (COREG) tablet 3.125 mg  3.125 mg Oral BID WITH MEALS    levothyroxine (SYNTHROID) tablet 50 mcg  50 mcg Oral ACB    spironolactone (ALDACTONE) tablet 25 mg  25 mg Oral DAILY    acetaminophen (TYLENOL) tablet 650 mg  650 mg Oral Q4H PRN    ondansetron (ZOFRAN) injection 4 mg  4 mg IntraVENous Q4H PRN    Warfarin - Pharmacy to Dose  1 Each Other Rx Dosing/Monitoring        LAB AND IMAGING FINDINGS:     Lab Results   Component Value Date/Time    WBC 13.7 04/10/2017 04:45 AM    HGB 12.7 04/10/2017 04:45 AM    PLATELET 648 59/03/4826 04:45 AM     Lab Results   Component Value Date/Time    Sodium 149 04/10/2017 04:45 AM    Potassium 4.0 04/10/2017 04:45 AM    Chloride 107 04/10/2017 04:45 AM    CO2 38 04/10/2017 04:45 AM    BUN 59 04/10/2017 04:45 AM    Creatinine 1.33 04/10/2017 04:45 AM    Calcium 9.2 04/10/2017 04:45 AM    Magnesium 2.1 04/11/2017 04:30 AM    Phosphorus 2.8 12/03/2009 03:28 AM      Lab Results   Component Value Date/Time    AST (SGOT) 34 04/09/2017 03:32 AM    Alk.  phosphatase 55 04/09/2017 03:32 AM    Protein, total 7.4 04/09/2017 03:32 AM    Albumin 2.2 04/09/2017 03:32 AM    Globulin 5.2 04/09/2017 03:32 AM     Lab Results   Component Value Date/Time    INR 4.3 04/11/2017 04:30 AM    Prothrombin time 38.9 04/11/2017 04:30 AM    aPTT 51.0 04/02/2017 03:40 AM      No results found for: IRON, FE, TIBC, IBCT, PSAT, FERR   No results found for: PH, PCO2, PO2  No components found for: [place]   Lab Results   Component Value Date/Time    CK 81 04/03/2017 07:25 PM    CK - MB <1.0 04/03/2017 07:25 PM              Total time: 35 min  Counseling / coordination time: 35  > 50% counseling / coordination      Warrick Spatz MD  Palliative Medicine

## 2017-04-11 NOTE — PROGRESS NOTES
1925: Assumed patient care, he was alert and oriented to person, place, time and situation. Vital signs were stable. MEWS score was a two. Respiratory status was stable on 5L via nasal cannula. Patient denied any pain, discomfort, nausea, vomiting, dizziness or anxiety. FLACC score suggested that he was comfortable. White board was updated and explained. Bed was locked and in lowest position. Call bell and personal belongings were within reach. Patient was on aspiration precautions. He had no questions, comments, or concerns after bedside shift report. 2100: Patient had episodes of oxygen desaturation with increasing congestion, so this nurse called the Hospitalist, Dr. Lizarraga Shown, and received an order to get the patient suctioned by Respiratory Therapy. Therapist was at the bedside at this time patient appeared to tolerate the treatment well.

## 2017-04-11 NOTE — PROGRESS NOTES
Palliative Medicine    Confirmed w/ patient and wife desire to transition to full comfort care orders. He is interested in having something to eat- they understand the risk of aspiration. Recommend continuing phenytoin while he is still alert and interactive(can crush chewable tab), allow diet as desired and avoid scopolamine patch in alert and oriented patient. Will only sedate and contribute to side effects. Atropine drops available for terminal secretions. He should be able to be comfortable on NC rather than NRB w/ low dose morphine for dyspnea.       Kaylah Lockhart MD  Palliative Medicine

## 2017-04-11 NOTE — PROGRESS NOTES
Problem: Dysphagia (Adult)  Goal: *Acute Goals and Plan of Care (Insert Text)  Dysphagia Present: moderate  Aspiration: at risk     Recommendations:  Diet: full liquid honey-thick  Meds: crushed  Aspiration Precautions  Other: small sips/bites, slow rate, alternate solids/liquids  1:1 supervision with meals  Ensure pt swallows before next bite sip  All PO via TSP    Goals: Patient will:  1. Tolerate PO trials with 0 s/s overt distress in 4/5 trials  2. Utilize compensatory swallow strategies/maneuvers (decrease bite/sip, size/rate, alt. liq/sol) with min cues in 4/5 trials  3. Complete an objective swallow study (i.e., MBSS) to assess swallow integrity, r/o aspiration, and determine of safest LRD, min A        Outcome: Resolved/Met Date Met:  04/11/17  SPEECH LANGUAGE PATHOLOGY DYSPHAGIA TREATMENT/DISCHARGE     Patient: Aiden Hauser (74 y.o. male)  Date: 4/11/2017  Diagnosis: Acute pulmonary edema (HCC) Systolic CHF, acute (HCC)       Precautions: Aspiration. DNR, Fall      ASSESSMENT:  Moderate oropharyngeal dysphagia. Per RN Vitor Montana, the pt transitioned to comfort care at this time. Oral care completed. Pt refused PO other than ice chips. Strategies the pt benefited from: spoon presentation, full spoon bites/sip, decreased rate, and wait time for double swallow and/or readiness for PO intake. Education re: comfort care/dysphagia, aspiration precautions, compensatory strategies, and diet textures provided to pt and family. Pt offered choices: puree vs full liquid diet; pt preferred full liquid diet. Pt and family verbalized/demonstrated understanding with SLP recommendations for safety and comfort. SLP signing off. PLAN: full HTL diet   Recommendations and Planned Interventions:  Education and training completed with pt and family re: swallow safety. SLP signing off.    Discharge Recommendations: Hospice Consult       SUBJECTIVE:   Patient stated *head nod no* for puree and *head nod yes* for full liquids. OBJECTIVE:   Cognitive and Communication Status:  Neurologic State: Alert  Orientation Level: Oriented X4  Cognition: Follows commands  Perception: Appears intact  Perseveration: No perseveration noted  Safety/Judgement: Fall prevention  Dysphagia Treatment:  Oral Assessment:  Oral Assessment  Labial: Decreased rate, Right droop  Dentition: Intact, Natural  Oral Hygiene: good  Lingual: Decreased rate  Velum: No impairment  Mandible: No impairment  P.O.  Trials:              Patient Position: HOB 55              Vocal quality prior to P.O.: Low volume              Consistency Presented: Honey thick liquid, Puree              How Presented: SLP-fed/presented, Spoon              How Much:  (x2oz each)              Bolus Acceptance: No impairment              Bolus Formation/Control: Impaired              Type of Impairment: Mastication              Propulsion: Delayed (# of seconds), Discoordination              Oral Residue: None              Initiation of Swallow: Delayed (# of seconds)              Laryngeal Elevation: Decreased, Weak              Aspiration Signs/Symptoms: Infiltrate on chest xray              Pharyngeal Phase Characteristics: Audible swallow, Suspected pharyngeal residue              Effective Modifications: Small sips and bites, Spoon              Cues for Modifications: Minimal                                Oral Phase Severity: Moderate              Pharyngeal Phase Severity : Moderate                PAIN:  Start of Tx: 0 reported   End of Tx: 0 reported     After treatment:   [ ]              Patient left in no apparent distress sitting up in chair  [ ]              Patient left in no apparent distress in bed  [X]              Call bell left within reach  [X]              Nursing notified  [X]              Family present  [ ]              Caregiver present  [ ]              Bed alarm activated         COMMUNICATION/EDUCATION:   [X]        Aspiration precautions; swallow safety; compensatory techniques  [ ]        Patient unable to participate in education; education ongoing with staff  [ ]         Posted safety precautions in patient's room.   [ ]         Oral-motor/laryngeal strengthening exercises        JAYY Farley  Time Calculation: 16 mins

## 2017-04-12 NOTE — PROGRESS NOTES
Palliative Medicine Progress Note  [facility]: [phone] ([phone])  [facility] [phone]   [facility]: [phone]    Patient Name: Navid Meraz  YOB: 1933    Date of Initial Consult: 4/5/17  Reason for Consult: care decisions  Requesting Provider: Dr. Helder Bruno   Primary Care Physician: John Winn MD      SUMMARY:   Navid Meraz is a 80y.o. 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 Walking w/ cane and feeding self at home, though wife says he never completely got over last hospitalization. Prior to then had had about four hospitalizations for aspiration since major stroke in 2008. No other hospitalization in last year. Has lost significant wt in last six months. 4/6/17: Feels well, worked w/ PT yesterday. NPO due to results of MBS. It seems his dysphagia is worsening. 4/7/17: Worked w/ SLP yesterday who feels he only needs careful feeding w/ modified consistency of oral intake. He feels well, denies complaints. 4/10/17: continues w/ poor intake and evidence of persistent aspiration. SLP still working on diet modification to reduce risk. Hospitalists now recommending against attempt at rehab.    4/11/14: For home w/ hospice today, but developed hypotension and hypoxia overnight. Now on 100% NRB. Says he does feel SOB. 4/12/17: Had a good night, comfortable on nasal canula, wife says he is sleepy and a little confused. He denies complaints this am. Has been unable to swallow po effectively.      PALLIATIVE DIAGNOSES:     Patient Active Problem List   Diagnosis Code    Chronic systolic heart failure (HCC) I50.22    A-fib (HCC) I48.91    Type II or unspecified type diabetes mellitus without mention of complication, not stated as uncontrolled E11.9    History of CVA (cerebrovascular accident) Z80.78    CKD (chronic kidney disease) N18.9    Seizure disorder ([facility] G40.909    Dysphagia R13.10    CHF (congestive heart failure) (HCC) I50.9    Acute respiratory failure with hypercapnia (HCC) J96.02    Aspiration pneumonia (HCC) J69.0    Pulmonary fibrosis (HCC) J84.10    Acute pulmonary edema (HCC) K41.8    Systolic CHF, acute (HCC) I50.21    Constipation K59.00    Failure to thrive in adult R62.7    Cachexia ([facility] R64     1. PLAN:   1. Met with patient and his wife. He was largely non verbal. Reviewed clinical course and possibility that rapid readmission for aspiration may be a marker for worsening aspiration and general decline. Has AMD and DDNR on chart. She is not certain if he will be willing to discharge to SNF rather than home with PT if it is recommended. May derive only marginal benefit. Should he go home and certainly in the longer run she will need additional personal home care. Admission to SNF carries risk of hospital readmission. He denies current sxs. Questions answered. 4/6/17: Will likely have to choose whether to have PEG placed. Discussed the pros and cons of proceeding. PEG will not eliminate risk of aspiration, but probably help him maintain body weight. Could opt for tube and continue to have comfort feedings orally as long as they accept the risk. Questions answered. 4/7/17: Not clear he is going to thrive relying solely on po intake, but at least in theory he can continue to manage for awhile and doesn't need to consider PEG. Questions answered. 4/10/17: Discussed w/ patient and wife. If goal of care it to return home and maximize comfort, accepting aspiration risk and acknowleging his intake will not sustain him long term, hospice is a good option to meet this goal. Rehab would convey high risk of rehospitalization. Intermediate plan would be HH w/ PT w/ transition to hospice if poor intake and aspiration persist. He is not interested in this.  They would like to d/c home w/ hospice. Consult placed. Would recommend d/c atorvastatin, replace warfarin w/ 160 mg ASA, and transition to less strict glycemic control w/ less insulin. 4/11/14: Initial thought is volume overload, but not consistent w/ exam and hypotension. Aspiration event vs bleeding, PE, etc. CXR pending. Will need to delay transfer home until respiratory status more stable. If progressive decline, would begin full comfort orders here. 4/12/17: appears stable with comfort plan. May be able to proceed with original plan of home with hospice. Wife is hesitant. Will see how he is doing later in day. 2. Initial consult note routed to primary continuity provider  3. Communicated plan of care with: Palliative IDT       GOALS OF CARE:     comfort    Advance Care Planning 4/5/2017   Patient's Healthcare Decision Maker is: Named in scanned ACP document   Primary Decision Maker Name Edward Robledo   Primary Decision Maker Phone Number [phone]   Primary Decision Maker Relationship to Patient Spouse   Confirm Advance Directive Yes, on file   Does the patient have other document types Do Not Resuscitate; Power of [employer]; Other (comment)           HISTORY:     History obtained from: patient and wife    CHIEF COMPLAINT: see summary    HPI/SUBJECTIVE:    The patient is:   [] Verbal and participatory  [x] Non-participatory due to:   Minimal verbal response     Clinical Pain Assessment (nonverbal scale for nonverbal patients): Pain: 1  denies       FUNCTIONAL ASSESSMENT:     Palliative Performance Scale (PPS): 40  PPS: 40       PSYCHOSOCIAL/SPIRITUAL SCREENING:     Advance Care Planning:  Advance Care Planning 4/5/2017   Patient's Healthcare Decision Maker is: Named in scanned ACP document   Primary Decision Maker Name Edward Robledo   Primary Decision Maker Phone Number [phone]   Primary Decision Maker Relationship to Patient Spouse   Confirm Advance Directive Yes, on file   Does the patient have other document types Do Not Resuscitate; Power of [employer]; Other (comment)        Any spiritual / Anglican concerns:  [] Yes /  [x] No    Caregiver Burnout:  [] Yes /  [x] No /  [] No Caregiver Present      Anticipatory grief assessment:   [x] Normal  / [] Maladaptive       ESAS Anxiety: Anxiety: 1    ESAS Depression: Depression: 0        REVIEW OF SYSTEMS:     Positive and pertinent negative findings in ROS are noted above in HPI. The following systems were [] reviewed / [x] unable to be reviewed as noted in HPI  Other findings are noted below. Systems: constitutional, ears/nose/mouth/throat, respiratory, gastrointestinal, genitourinary, musculoskeletal, integumentary, neurologic, psychiatric, endocrine. Positive findings noted below. Modified ESAS Completed by: provider   Fatigue: 6 Drowsiness: 4   Depression: 0 Pain: 1   Anxiety: 1 Nausea: 0   Anorexia: 6 Dyspnea: 6     Constipation: No     Stool Occurrence(s): 0        PHYSICAL EXAM:     Wt Readings from Last 3 Encounters:   04/11/17 55.5 kg (122 lb 5.7 oz)   01/03/17 65.2 kg (143 lb 12.8 oz)   01/12/16 69.9 kg (154 lb)     Blood pressure 105/61, pulse (!) 102, temperature 98.4 °F (36.9 °C), resp. rate 18, height 5' 9\" (1.753 m), weight 55.5 kg (122 lb 5.7 oz), SpO2 98 %. Pain:  Pain Scale 1: Visual  Pain Intensity 1: 0  Pain Onset 1: generalized           Pain Intervention(s) 1: MD notified (comment)  Last bowel movement: yesterday    Constitutional: frail, NAD on NRB  Eyes: pupils equal, anicteric  ENMT: no nasal discharge, moist mucous membranes  Cardiovascular: regular rhythm, distal pulses intact  Respiratory: breathing not labored, symmetric  Gastrointestinal: soft non-tender, +bowel sounds  Musculoskeletal: no deformity, no tenderness to palpation  Skin: warm, dry  Neurologic: following commands, moving all extremities.  R hemiparesis\  Psychiatric: full affect, no hallucinations  Other:       HISTORY:     Principal Problem:    Systolic CHF, acute ([facility] (4/3/2017)    Active Problems:    A-fib ([facility] (12/3/2012)      History of CVA (cerebrovascular accident) (12/4/2012)      CKD (chronic kidney disease) (12/4/2012)      Seizure disorder ([facility] (9/2/2013)      Dysphagia (1/12/2016)      Acute pulmonary edema ([facility] (4/2/2017)      Constipation (4/5/2017)      Failure to thrive in adult (4/10/2017)      Cachexia ([facility] (4/10/2017)      Past Medical History:   Diagnosis Date    Atrial fibrillation ([facility]     CAD (coronary artery disease)     Heart failure (HCC)     High cholesterol     Hypertension     Kidney stone     [facility]     Stroke [facility])       Past Surgical History:   Procedure Laterality Date    CARDIAC SURG PROCEDURE UNLIST      aneurysm repair    HX UROLOGICAL      kidney stone removed      History reviewed. No pertinent family history. History reviewed, no pertinent family history.   Social History   Substance Use Topics    Smoking status: Former Smoker    Smokeless tobacco: Not on file    Alcohol use No     No Known Allergies   Current Facility-Administered Medications   Medication Dose Route Frequency    morphine (ROXANOL) 100 mg/5 mL (20 mg/mL) concentrated solution 10 mg  10 mg Oral Q1H PRN    acetaminophen (TYLENOL) suppository 650 mg  650 mg Rectal Q4H PRN    atropine 1 % ophthalmic solution 3 Drop  3 Drop SubLINGual TID PRN    bisacodyl (DULCOLAX) suppository 10 mg  10 mg Rectal DAILY PRN    LORazepam (ATIVAN) injection 1 mg  1 mg IntraVENous Q2H PRN    phenytoin (DILANTIN) chewable tablet 100 mg  100 mg Oral BID    metoprolol (LOPRESSOR) injection 1.25 mg  1.25 mg IntraVENous Q6H PRN    albuterol-ipratropium (DUO-NEB) 2.5 MG-0.5 MG/3 ML  3 mL Nebulization Q4H PRN    ondansetron (ZOFRAN) injection 4 mg  4 mg IntraVENous Q4H PRN        LAB AND IMAGING FINDINGS:     Lab Results   Component Value Date/Time    WBC 13.7 04/10/2017 04:45 AM    HGB 12.7 04/10/2017 04:45 AM    PLATELET 914 77/94/4766 04:45 AM     Lab Results   Component Value Date/Time Sodium 149 04/10/2017 04:45 AM    Potassium 4.0 04/10/2017 04:45 AM    Chloride 107 04/10/2017 04:45 AM    CO2 38 04/10/2017 04:45 AM    BUN 59 04/10/2017 04:45 AM    Creatinine 1.33 04/10/2017 04:45 AM    Calcium 9.2 04/10/2017 04:45 AM    Magnesium 2.1 04/11/2017 04:30 AM    Phosphorus 2.8 12/03/2009 03:28 AM      Lab Results   Component Value Date/Time    AST (SGOT) 34 04/09/2017 03:32 AM    Alk.  phosphatase 55 04/09/2017 03:32 AM    Protein, total 7.4 04/09/2017 03:32 AM    Albumin 2.2 04/09/2017 03:32 AM    Globulin 5.2 04/09/2017 03:32 AM     Lab Results   Component Value Date/Time    INR 4.3 04/11/2017 04:30 AM    Prothrombin time 38.9 04/11/2017 04:30 AM    aPTT 51.0 04/02/2017 03:40 AM      No results found for: IRON, FE, TIBC, IBCT, PSAT, FERR   No results found for: PH, PCO2, PO2  No components found for: [place]   Lab Results   Component Value Date/Time    CK 81 04/03/2017 07:25 PM    CK - MB <1.0 04/03/2017 07:25 PM              Total time: 25 min  Counseling / coordination time: 20  > 50% counseling / coordination      Deonna Lundberg MD  Palliative Medicine

## 2017-04-12 NOTE — ROUTINE PROCESS
Bedside and Verbal shift change report given to TAMIR Carlos RN (oncoming nurse) by Wesly Casanova RN (offgoing nurse). Report included the following information SBAR, Kardex, Intake/Output, MAR and Recent Results.

## 2017-04-12 NOTE — PROGRESS NOTES
Assumed care of pt. Pt in bed appears to be drowsy. Pt responds to voice by partial eye opening and head nodding. No verbal response. Lung sounds shallow and diminished. Nasal cannula, 5L.  Pt appears to be in no distress

## 2017-04-12 NOTE — PROGRESS NOTES
0- Received pt from DICK Sharif, RN. Pt in room, resting quietly, wife bedside. Pt states he is comfortable. Room clutter free, call light and phone within reach. 9306- Physical assessment completed at this time. Pt's wife attempted to feed pt, but pt did not swallow. Pt suctioned at this time. Educated family that it is not safe for pt to eat if pt is unable to swallow    1028- No other needs stated at this time.

## 2017-04-12 NOTE — ROUTINE PROCESS
TRANSFER - IN REPORT:    Verbal report received from TAMIR Zacarias RN(name) on Terrie Martinez  being received from [address] [place] [address](unit) for routine progression of care      Report consisted of patients Situation, Background, Assessment and   Recommendations(SBAR). Information from the following report(s) SBAR, Kardex, Intake/Output and MAR was reviewed with the receiving nurse. Opportunity for questions and clarification was provided. Assessment completed upon patients arrival to unit and care assumed.

## 2017-04-12 NOTE — PROGRESS NOTES
Hospitalist Progress Note-critical care note     Patient: Liliana Leonardo MRN: [id number]  [facility]: [id number]    YOB: 1933  Age: 80 y.o. Sex: male    DOA: 4/2/2017 LOS:  LOS: 10 days            Chief complaint: pulmonary fibrosis, dysphagia , failure to thrive     Assessment/Plan         Patient Active Problem List   Diagnosis Code    Chronic systolic heart failure (HCC) I50.22    A-fib ([facility]) I48.91    Type II or unspecified type diabetes mellitus without mention of complication, not stated as uncontrolled E11.9    History of CVA (cerebrovascular accident) Z80.78    CKD (chronic kidney disease) N18.9    Seizure disorder ([address] G40.909    Dysphagia R13.10    CHF (congestive heart failure) ([facility]) I50.9    Acute respiratory failure with hypercapnia ([facility]) J96.02    Aspiration pneumonia ([address] J69.0    Pulmonary fibrosis ([address] J84.10    Acute pulmonary edema ([facility]) D63.0    Systolic CHF, acute ([address] I50.21    Constipation K59.00    Failure to thrive in adult R62.7    Cachexia ([address] R64     1. CHF systolic acute. EF 45%. 2. Atrial fibrillation. 3. CKD. Continue monitor, stable     4. Hx CVA. Fall precaution     5. Seizure disorder. Ativan prn     6. Hypothyroidism. 7. Dysphagia. Dysphagia diet, comfort feeding      8 aphagia   Due to the stroke     9 debility  10 hypotension  Resolved     11. Pulmonary fibrosis ,   Continue breathing tx.  mucinex     12 constipation  13 aspiration pna  Continue levaquin day 6   14 failure  to thrive   15 cachexia,severe malnutrition      Wife and family were at the bedside. All questions have been answered. Patient  looks comfortable.  Wife does not want to move him and preparing his passing in the hospital.     Review of systems:  Unable to obtain due to weakness     Vital signs/Intake and Output:  Visit Vitals    /61    Pulse (!) 102    Temp 98.4 °F (36.9 °C)    Resp 18    Ht 5' 9\" (1.753 m)    Wt 55.5 kg (122 lb 5.7 oz)    SpO2 98%    BMI 18.07 kg/m2     Current Shift:     Last three shifts:       Physical Exam:  General: Alert, no acute distress, cachexia    HEENT: NC, Atraumatic. PERRLA, anicteric sclerae. Lungs:  rales, no wheezing   Heart:  Regular  rhythm,  + murmur, No Rubs, No Gallops  Abdomen: Soft, Non distended, Non tender.  +Bowel sounds,   Extremities: No c/c/e  Psych:   Not anxious or agitated. Neurologic:  Unable to detect due to weakness           Labs: Results:       Chemistry Recent Labs      04/10/17   0445   GLU  157*   NA  149*   K  4.0   CL  107   CO2  38*   BUN  59*   CREA  1.33*   CA  9.2   AGAP  4   BUCR  44*      CBC w/Diff Recent Labs      04/10/17   0445   WBC  13.7*   RBC  4.12*   HGB  12.7*   HCT  39.3   PLT  174   GRANS  65   LYMPH  20*   EOS  2      Cardiac Enzymes No results for input(s): CPK, CKND1, YI in the last 72 hours. No lab exists for component: CKRMB, TROIP   Coagulation Recent Labs      04/11/17   0430  04/10/17   0445   PTP  38.9*  33.2*   INR  4.3*  3.5*       Lipid Panel Lab Results   Component Value Date/Time    Cholesterol, total 110 09/03/2013 02:01 AM    HDL Cholesterol 42 09/03/2013 02:01 AM    LDL, calculated 56.6 09/03/2013 02:01 AM    VLDL, calculated 11.4 09/03/2013 02:01 AM    Triglyceride 57 09/03/2013 02:01 AM    CHOL/HDL Ratio 2.6 09/03/2013 02:01 AM      BNP No results for input(s): BNPP in the last 72 hours. Liver Enzymes No results for input(s): TP, ALB, TBIL, AP, SGOT, GPT in the last 72 hours.     No lab exists for component: DBIL   Thyroid Studies Lab Results   Component Value Date/Time    TSH 1.75 01/09/2016 07:00 AM        Procedures/imaging: see electronic medical records for all procedures/Xrays and details which were not copied into this note but were reviewed prior to creation of Oksana Fam MD

## 2017-04-12 NOTE — PROGRESS NOTES
TRANSFER - OUT REPORT:    Verbal report given to DEANDRE Guerrero(name) on Cortes Sung  being transferred to (unit) for routine progression of care       Report consisted of patients Situation, Background, Assessment and   Recommendations(SBAR). Information from the following report(s) SBAR and Kardex was reviewed with the receiving nurse. Lines:   Peripheral IV 04/08/17 Left; Lower Arm (Active)   Site Assessment Clean, dry, & intact 4/10/2017  7:25 PM   Phlebitis Assessment 0 4/10/2017  7:25 PM   Infiltration Assessment 0 4/10/2017  7:25 PM   Dressing Status Clean, dry, & intact 4/10/2017  7:25 PM   Dressing Type Transparent;Tape 4/10/2017  7:25 PM   Hub Color/Line Status Pink;Flushed;Patent;Capped 4/10/2017  7:25 PM   Action Taken Open ports on tubing capped 4/10/2017  7:25 PM   Alcohol Cap Used Yes 4/10/2017  7:25 PM        Opportunity for questions and clarification was provided. Patient transported with:   O2 @ 4 liters     Mrs. Yonatan Chavarria contacted and made aware of transfer to [facility]

## 2017-04-12 NOTE — ROUTINE PROCESS
0030 Pt in rm 302. No apparent distress. 0114 Bedside and Verbal shift change report given to Carlos Curry RN  (oncoming nurse) by Sonia De Oliveira RN  (offgoing nurse). Report included the following information SBAR, Kardex, Intake/Output and MAR.

## 2017-04-12 NOTE — ROUTINE PROCESS
Bedside shift change report given to RIC Patiño RN (oncoming nurse) by mitral regurgitation  (offgoing nurse). Report included the following information SBAR, Kardex and MAR.

## 2017-04-13 NOTE — PROGRESS NOTES
Bereavement Note:     responded to the death of Elidia Hinds, who is a 80 y.o., male, offering [facility] to patient and family, see flow sheets for interventions. Date of Death: 2017  Time of Death: 730    Extended Emergency Contact Information  Primary Emergency Contact: Keysha Bansal  Address: [address] [place] [address] Phone: [phone]  Relation: Spouse                 YES      NO  UNKNOWN  Life [employer]   []        [x]    []   [employer]   [] [x] []   Medical Examiner  []        [x]  []   Going to [employer]  []        [x] []      Autopsy   []        [x]         []   Sympathy Card  []        [x]  Bereavement Materials  [x]        []           Business Card Provided  [x]        []              Home: Beatriz Seg[address]     called. Chaplains will continue to follow family and will provide spiritual care as needed.    T[address]e Soto 68  Board Certified   Director, [address] [address]  Office: [phone]  Mobile: [phone]  Pager: [phone]

## 2017-04-13 NOTE — PROGRESS NOTES
1297- Staff went into room to change and turn pt. Family refused. Family stated they will notify staff if/when they would like pt changed and/or turned. Shift Summary- Pt transferred to room 320 for better care and comfort for both pt and family. During shift change pt passed. MD notified. Family at bedside.

## 2017-04-13 NOTE — PROGRESS NOTES
Death note :      Paged to bedside TO ACCESS THE PATIENT. PT DID NOT RESPONSE TO  VERBAL OR PAIN STIMULI. NO BREATHING SOUND HEARD OVER 1 MIN. NO CORNEAL REFLUX ADD PUPIL DILATED.  TIME OF DEATH : 7:30 on April 13,2017       FAMILY WAS at the bedside

## 2017-04-13 NOTE — DISCHARGE SUMMARY
Discharge Summary    Patient: Cortes Sung MRN: [id number]  CSN: [id number]    YOB: 1933  Age: 80 y.o. Sex: male    DOA: 4/2/2017 LOS:  LOS: 11 days   Discharge Date:      Primary Care Provider:  Rickie Tovar MD    Admission Diagnoses: Acute pulmonary edema ([address]    Cause of  Death   Patient Active Problem List   Diagnosis Code    Chronic systolic heart failure ([address] I50.22    A-fib ([address] I48.91    Type II or unspecified type diabetes mellitus without mention of complication, not stated as uncontrolled E11.9    History of CVA (cerebrovascular accident) Z80.78    CKD (chronic kidney disease) N18.9    Seizure disorder ([address] G40.909    Dysphagia R13.10    CHF (congestive heart failure) ([address] I50.9    Acute respiratory failure with hypercapnia ([address] J96.02    Aspiration pneumonia ([address] J69.0    Pulmonary fibrosis ([address] J84.10    Acute pulmonary edema ([address] E49.6    Systolic CHF, acute ([address] I50.21    Constipation K59.00    Failure to thrive in adult R62.7    Cachexia ([address] R64       Procedures : none     Consults: Cardiology                                     Admission HPI :   Cortes Sung is a 80 y.o. male who has a past medical history significant for atrial fibrillation, pulmonary fibrosis, seizure disorder, history of CVA, hypertension, hyperlipidemia and coronary artery disease presenting with a one-day history worsening cough since yesterday and shortness of breath. On arrival he was afebrile, pulse 90, blood pressure 105/46, respirations 22 with oxygen saturations of 88% on room air which rapidly improved to 95% on 3 L by nasal cannula. Laboratory workup was significant for white blood cell count 14.3, mild anemia with hemoglobin 12.0 and chronic thrombocytopenia at 1:01. His INR was 2.4, glucose 129, creatinine 1.34 and mildly elevated troponin 0.06 with a pro BNP of 4374 which is elevated from his baseline.  A chest x-ray showed both cardiomegaly and pulmonary edema. An echocardiogram from December of last year showed an ejection fraction between 45 and 50% with concentric left ventricular hypertrophy, dilated bilateral atria with no additional consequential findings. In the ER he was given Lasix 80 mg times one, breathing treatments and subsequently hospital medicine was contacted for admission to the hospital and further management. Hospital Course :   Cardiology was on board for chf exacerbation, iv lasix was administrated for CHF. We continued medication for his afib and anti-seizure medication. However, he has dysphagia, speech was on board, he was put on -npo multiple times due to the recurrent aspiration. Chest PT was performed for mucus. Breathing treatment and iv abx were administrated for his aspiration pneumonia. He aspirated recurrently. Wife does not want to have PEG tube. He has pulmonary fibrosis with repeated cxr:  Findings of asbestos related pleural disease, with bilateral airspace  opacities as above, which may represent underlying atelectasis or airspace disease. His condition declined and not improving per treatment. [address] [place] [address] Dr and family do not want further treatment, palliative care on board and comfort care was granted. Paged to bedside TO ACCESS THE PATIENT.      PT DID NOT RESPONSE TO VERBAL OR PAIN STIMULI. NO BREATHING SOUND HEARD OVER 1 MIN. NO CORNEAL REFLUX ADD PUPIL DILATED. TIME OF DEATH : 7:30 on April 13,2017        FAMILY WAS at the bedside   Minutes spent on discharge: 60 min       Labs: Results:       Chemistry No results for input(s): GLU, NA, K, CL, CO2, BUN, CREA, CA, AGAP, BUCR, TBIL, GPT, AP, TP, ALB, GLOB, AGRAT in the last 72 hours. CBC w/Diff No results for input(s): WBC, RBC, HGB, HCT, PLT, GRANS, LYMPH, EOS, HGBEXT, HCTEXT, PLTEXT in the last 72 hours. Cardiac Enzymes No results for input(s): CPK, CKND1, YI in the last 72 hours.     No lab exists for component: CKRMB, TROIP   Coagulation Recent Labs      04/11/17 0430   PTP  38.9*   INR  4.3*       Lipid Panel Lab Results   Component Value Date/Time    Cholesterol, total 110 09/03/2013 02:01 AM    HDL Cholesterol 42 09/03/2013 02:01 AM    LDL, calculated 56.6 09/03/2013 02:01 AM    VLDL, calculated 11.4 09/03/2013 02:01 AM    Triglyceride 57 09/03/2013 02:01 AM    CHOL/HDL Ratio 2.6 09/03/2013 02:01 AM      BNP No results for input(s): BNPP in the last 72 hours. Liver Enzymes No results for input(s): TP, ALB, TBIL, AP, SGOT, GPT in the last 72 hours. No lab exists for component: DBIL   Thyroid Studies Lab Results   Component Value Date/Time    TSH 1.75 01/09/2016 07:00 AM            Significant Diagnostic Studies: Xr Swallow Func Video    Result Date: 4/7/2017  Procedure: Modified Barium Swallow with Speech Therapy Indication: Stroke, dysphagia, feeding difficulties, aspiration pneumonia Radiation dose (reference air kerma): 14.9 mGy Technique: Multiple consistencies administered by speech therapy, including honey, solid and pudding consistencies. _______________ FINDINGS: -Honey barium:  Silent aspiration. -Solid consistency:  Oral and swallowing delay without aspiration or penetration. -Pudding:  Oral and swallowing delay with premature spillage. No findings of aspiration or penetration. Residual material is present in the vallecula. _______________     IMPRESSION: As above. Xr Chest Port    Result Date: 4/11/2017  Chest, single view Indication: Shortness of breath Comparison: Several prior exams, most recently April 5, 2017. Findings:  Portable upright AP view of the chest was obtained. The degree of pulmonary inflation is similar to prior examinations. Pleural thickening and associated calcifications are noted bilaterally, most conspicuously along the left hemithorax laterally and along the left hemidiaphragm. Progressive airspace opacification noted in the left mid and upper lung, with persistent airspace opacities in the right upper lobe.  No pneumothorax or large pleural effusion. Enlarged cardiac silhouette stable from prior. Aortic contour largely obscured. Postop changes of median sternotomy present. No acute osseous abnormality. Retained oral contrast noted within the region of the splenic flexure. Impression: 1. Findings of asbestos related pleural disease, with bilateral airspace opacities as above, which may represent underlying atelectasis or airspace disease. 2. Cardiomegaly, similar to prior. Xr Chest Port    Result Date: 4/5/2017  A portable AP radiograph of the chest was obtained at 1554 hours: INDICATION:  Cough and shortness of breath. . COMPARISON:  Multiple prior studies are available the most recent being 4/2/2017. FINDINGS:  Heart and mediastinum: Median sternotomy with borderline heart size. Lungs and pleura: Extensive reticular densities are seen throughout both lungs with other areas of patchy densities. Pleural thickening and calcifications are present. Pleural-parenchymal scarring noted extending from the lung apices worse on the left. The right apex demonstrates progressively mildly increasing densities since the 12/30/2016 study. Aorta: Partially calcified. Bones: Within normal limits for age. Other: None. Impression: Borderline heart size with extensive pleural and parenchymal densities nearly all of which are likely chronic. However, there has been a mild progressive increase in the opacities in the right apex either due to worsening fibrosis or developing infiltrates within the background of scarring. 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 Xr Chest Port    Result Date: 4/2/2017  CHEST AP PORTABLE, 1 View Clinical History:  Cough. Comparison:  December 31, 2016. Findings: Prior median sternotomy. The cardiomediastinal silhouette is enlarged but stable. There is persistent prominence in the superior left mediastinum with deviation of the trachea to the right.   There is severe bilateral interstitial fibrosis with areas of parenchymal opacity throughout the lungs bilaterally. There is significant biapical pleural parenchymal opacity. There are calcified pleural plaques along the left hemidiaphragm and left lateral pleura. There is persistent consolidation in the medial left lung base. Overall these findings appear relatively stable. IMPRESSION: 1. Significantly abnormal chest radiograph as described above, overall relatively stable compared to the prior study.              [facility]     CC: Aretta Spurling, MD

## 2017-04-13 NOTE — PROGRESS NOTES
0730- Received pt TAMIR Carlos RN. Pt breathing labored and shallow with pauses. Pt . Dr. Suzanna Sagastume called to pronounce pt. Family bedside. Chaplain wagoner.

## 2017-04-13 NOTE — ROUTINE PROCESS
Bedside shift change report given to [place] (oncoming nurse) by Vonn Boeck RN (offgoing nurse). Report included the following information SBAR.
